# Patient Record
Sex: FEMALE | Race: WHITE | NOT HISPANIC OR LATINO | Employment: OTHER | URBAN - METROPOLITAN AREA
[De-identification: names, ages, dates, MRNs, and addresses within clinical notes are randomized per-mention and may not be internally consistent; named-entity substitution may affect disease eponyms.]

---

## 2017-03-14 ENCOUNTER — HISTORICAL (OUTPATIENT)
Dept: ADMINISTRATIVE | Facility: HOSPITAL | Age: 82
End: 2017-03-14

## 2017-03-29 ENCOUNTER — HISTORICAL (OUTPATIENT)
Dept: ADMINISTRATIVE | Facility: HOSPITAL | Age: 82
End: 2017-03-29

## 2017-03-30 ENCOUNTER — HISTORICAL (OUTPATIENT)
Dept: RADIOLOGY | Facility: HOSPITAL | Age: 82
End: 2017-03-30

## 2017-03-31 ENCOUNTER — HISTORICAL (OUTPATIENT)
Dept: ADMINISTRATIVE | Facility: HOSPITAL | Age: 82
End: 2017-03-31

## 2017-05-08 ENCOUNTER — HISTORICAL (OUTPATIENT)
Dept: ADMINISTRATIVE | Facility: HOSPITAL | Age: 82
End: 2017-05-08

## 2017-08-09 ENCOUNTER — HISTORICAL (OUTPATIENT)
Dept: ADMINISTRATIVE | Facility: HOSPITAL | Age: 82
End: 2017-08-09

## 2018-03-28 ENCOUNTER — HISTORICAL (OUTPATIENT)
Dept: ADMINISTRATIVE | Facility: HOSPITAL | Age: 83
End: 2018-03-28

## 2018-03-28 LAB
ABS NEUT (OLG): 7.01 X10(3)/MCL (ref 2.1–9.2)
ALBUMIN SERPL-MCNC: 4.2 GM/DL (ref 3.4–5)
ALBUMIN/GLOB SERPL: 1.4 {RATIO}
ALP SERPL-CCNC: 91 UNIT/L (ref 38–126)
ALT SERPL-CCNC: 21 UNIT/L (ref 12–78)
APPEARANCE, UA: CLEAR
AST SERPL-CCNC: 15 UNIT/L (ref 15–37)
BACTERIA SPEC CULT: ABNORMAL /HPF
BASOPHILS # BLD AUTO: 0 X10(3)/MCL (ref 0–0.2)
BASOPHILS NFR BLD AUTO: 0 %
BILIRUB SERPL-MCNC: 0.5 MG/DL (ref 0.2–1)
BILIRUB UR QL STRIP: NEGATIVE
BILIRUBIN DIRECT+TOT PNL SERPL-MCNC: 0.1 MG/DL (ref 0–0.2)
BILIRUBIN DIRECT+TOT PNL SERPL-MCNC: 0.4 MG/DL (ref 0–0.8)
BUN SERPL-MCNC: 16 MG/DL (ref 7–18)
CALCIUM SERPL-MCNC: 9.3 MG/DL (ref 8.5–10.1)
CHLORIDE SERPL-SCNC: 104 MMOL/L (ref 98–107)
CHOLEST SERPL-MCNC: 239 MG/DL (ref 0–200)
CHOLEST/HDLC SERPL: 2 {RATIO} (ref 0–4)
CO2 SERPL-SCNC: 30 MMOL/L (ref 21–32)
COLOR UR: YELLOW
CREAT SERPL-MCNC: 0.98 MG/DL (ref 0.55–1.02)
DEPRECATED CALCIDIOL+CALCIFEROL SERPL-MC: 31.98 NG/ML (ref 30–80)
EOSINOPHIL # BLD AUTO: 0.1 X10(3)/MCL (ref 0–0.9)
EOSINOPHIL NFR BLD AUTO: 1 %
ERYTHROCYTE [DISTWIDTH] IN BLOOD BY AUTOMATED COUNT: 12.9 % (ref 11.5–17)
GLOBULIN SER-MCNC: 3.1 GM/DL (ref 2.4–3.5)
GLUCOSE (UA): NEGATIVE
GLUCOSE SERPL-MCNC: 91 MG/DL (ref 74–106)
HCT VFR BLD AUTO: 44.9 % (ref 37–47)
HDLC SERPL-MCNC: 117 MG/DL (ref 35–60)
HGB BLD-MCNC: 14.8 GM/DL (ref 12–16)
HGB UR QL STRIP: NEGATIVE
KETONES UR QL STRIP: NEGATIVE
LDLC SERPL CALC-MCNC: 109 MG/DL (ref 0–129)
LEUKOCYTE ESTERASE UR QL STRIP: NEGATIVE
LYMPHOCYTES # BLD AUTO: 1.6 X10(3)/MCL (ref 0.6–4.6)
LYMPHOCYTES NFR BLD AUTO: 17 %
MCH RBC QN AUTO: 29.9 PG (ref 27–31)
MCHC RBC AUTO-ENTMCNC: 33 GM/DL (ref 33–36)
MCV RBC AUTO: 90.7 FL (ref 80–94)
MONOCYTES # BLD AUTO: 0.5 X10(3)/MCL (ref 0.1–1.3)
MONOCYTES NFR BLD AUTO: 5 %
NEUTROPHILS # BLD AUTO: 7.01 X10(3)/MCL (ref 2.1–9.2)
NEUTROPHILS NFR BLD AUTO: 76 %
NITRITE UR QL STRIP: NEGATIVE
PH UR STRIP: 7.5 [PH] (ref 5–9)
PLATELET # BLD AUTO: 201 X10(3)/MCL (ref 130–400)
PMV BLD AUTO: 10.3 FL (ref 9.4–12.4)
POTASSIUM SERPL-SCNC: 4.7 MMOL/L (ref 3.5–5.1)
PROT SERPL-MCNC: 7.3 GM/DL (ref 6.4–8.2)
PROT UR QL STRIP: NEGATIVE
RBC # BLD AUTO: 4.95 X10(6)/MCL (ref 4.2–5.4)
RBC #/AREA URNS HPF: ABNORMAL /[HPF]
SODIUM SERPL-SCNC: 139 MMOL/L (ref 136–145)
SP GR UR STRIP: 1.01 (ref 1–1.03)
SQUAMOUS EPITHELIAL, UA: ABNORMAL
TRIGL SERPL-MCNC: 67 MG/DL (ref 30–150)
TSH SERPL-ACNC: 1.72 MIU/ML (ref 0.36–3.74)
UA WBC MAN: ABNORMAL
UROBILINOGEN UR STRIP-ACNC: 0.2
VLDLC SERPL CALC-MCNC: 13 MG/DL
WBC # SPEC AUTO: 9.3 X10(3)/MCL (ref 4.5–11.5)

## 2019-03-17 LAB
INFLUENZA A ANTIGEN, POC: NEGATIVE
INFLUENZA B ANTIGEN, POC: NEGATIVE

## 2019-05-02 ENCOUNTER — HISTORICAL (OUTPATIENT)
Dept: ADMINISTRATIVE | Facility: HOSPITAL | Age: 84
End: 2019-05-02

## 2019-05-02 LAB
ABS NEUT (OLG): 3.19 X10(3)/MCL (ref 2.1–9.2)
ALBUMIN SERPL-MCNC: 4.1 GM/DL (ref 3.4–5)
ALBUMIN/GLOB SERPL: 1.4 {RATIO}
ALP SERPL-CCNC: 66 UNIT/L (ref 38–126)
ALT SERPL-CCNC: 22 UNIT/L (ref 12–78)
APPEARANCE, UA: CLEAR
AST SERPL-CCNC: 17 UNIT/L (ref 15–37)
BACTERIA SPEC CULT: NORMAL /HPF
BASOPHILS # BLD AUTO: 0 X10(3)/MCL (ref 0–0.2)
BASOPHILS NFR BLD AUTO: 1 %
BILIRUB SERPL-MCNC: 1.1 MG/DL (ref 0.2–1)
BILIRUB UR QL STRIP: NEGATIVE
BILIRUBIN DIRECT+TOT PNL SERPL-MCNC: 0.2 MG/DL (ref 0–0.2)
BILIRUBIN DIRECT+TOT PNL SERPL-MCNC: 0.9 MG/DL (ref 0–0.8)
BUN SERPL-MCNC: 22 MG/DL (ref 7–18)
CALCIUM SERPL-MCNC: 9.4 MG/DL (ref 8.5–10.1)
CHLORIDE SERPL-SCNC: 99 MMOL/L (ref 98–107)
CHOLEST SERPL-MCNC: 222 MG/DL (ref 0–200)
CHOLEST/HDLC SERPL: 2.1 {RATIO} (ref 0–4)
CO2 SERPL-SCNC: 29 MMOL/L (ref 21–32)
COLOR UR: YELLOW
CREAT SERPL-MCNC: 1.16 MG/DL (ref 0.55–1.02)
DEPRECATED CALCIDIOL+CALCIFEROL SERPL-MC: 41.71 NG/ML (ref 30–80)
EOSINOPHIL # BLD AUTO: 0.1 X10(3)/MCL (ref 0–0.9)
EOSINOPHIL NFR BLD AUTO: 2 %
ERYTHROCYTE [DISTWIDTH] IN BLOOD BY AUTOMATED COUNT: 13.3 % (ref 11.5–17)
GLOBULIN SER-MCNC: 2.9 GM/DL (ref 2.4–3.5)
GLUCOSE (UA): NEGATIVE
GLUCOSE SERPL-MCNC: 106 MG/DL (ref 74–106)
HCT VFR BLD AUTO: 44.4 % (ref 37–47)
HDLC SERPL-MCNC: 107 MG/DL (ref 35–60)
HGB BLD-MCNC: 14.7 GM/DL (ref 12–16)
HGB UR QL STRIP: NEGATIVE
KETONES UR QL STRIP: NEGATIVE
LDLC SERPL CALC-MCNC: 102 MG/DL (ref 0–129)
LEUKOCYTE ESTERASE UR QL STRIP: NEGATIVE
LYMPHOCYTES # BLD AUTO: 1.4 X10(3)/MCL (ref 0.6–4.6)
LYMPHOCYTES NFR BLD AUTO: 27 %
MCH RBC QN AUTO: 30.1 PG (ref 27–31)
MCHC RBC AUTO-ENTMCNC: 33.1 GM/DL (ref 33–36)
MCV RBC AUTO: 90.8 FL (ref 80–94)
MONOCYTES # BLD AUTO: 0.4 X10(3)/MCL (ref 0.1–1.3)
MONOCYTES NFR BLD AUTO: 8 %
NEUTROPHILS # BLD AUTO: 3.19 X10(3)/MCL (ref 2.1–9.2)
NEUTROPHILS NFR BLD AUTO: 62 %
NITRITE UR QL STRIP: NEGATIVE
PH UR STRIP: 6.5 [PH] (ref 5–9)
PLATELET # BLD AUTO: 176 X10(3)/MCL (ref 130–400)
PMV BLD AUTO: 10.9 FL (ref 9.4–12.4)
POTASSIUM SERPL-SCNC: 5 MMOL/L (ref 3.5–5.1)
PROT SERPL-MCNC: 7 GM/DL (ref 6.4–8.2)
PROT UR QL STRIP: NEGATIVE
RBC # BLD AUTO: 4.89 X10(6)/MCL (ref 4.2–5.4)
RBC #/AREA URNS HPF: NORMAL /[HPF]
SODIUM SERPL-SCNC: 133 MMOL/L (ref 136–145)
SP GR UR STRIP: 1.02 (ref 1–1.03)
SQUAMOUS EPITHELIAL, UA: NORMAL
TRIGL SERPL-MCNC: 63 MG/DL (ref 30–150)
TSH SERPL-ACNC: 1.91 MIU/L (ref 0.36–3.74)
UROBILINOGEN UR STRIP-ACNC: 0.2
VLDLC SERPL CALC-MCNC: 13 MG/DL
WBC # SPEC AUTO: 5.1 X10(3)/MCL (ref 4.5–11.5)
WBC #/AREA URNS HPF: NORMAL /[HPF]

## 2020-06-25 ENCOUNTER — HISTORICAL (OUTPATIENT)
Dept: ADMINISTRATIVE | Facility: HOSPITAL | Age: 85
End: 2020-06-25

## 2020-07-17 ENCOUNTER — HISTORICAL (OUTPATIENT)
Dept: ADMINISTRATIVE | Facility: HOSPITAL | Age: 85
End: 2020-07-17

## 2020-07-17 LAB
ABS NEUT (OLG): 3 X10(3)/MCL (ref 2.1–9.2)
ALBUMIN SERPL-MCNC: 4.1 GM/DL (ref 3.4–5)
ALBUMIN/GLOB SERPL: 1.6 RATIO (ref 1.1–2)
ALP SERPL-CCNC: 69 UNIT/L (ref 40–150)
ALT SERPL-CCNC: 12 UNIT/L (ref 0–55)
APPEARANCE, UA: CLEAR
AST SERPL-CCNC: 15 UNIT/L (ref 5–34)
BACTERIA SPEC CULT: NORMAL /HPF
BASOPHILS # BLD AUTO: 0 X10(3)/MCL (ref 0–0.2)
BASOPHILS NFR BLD AUTO: 0 %
BILIRUB SERPL-MCNC: 1.1 MG/DL
BILIRUB UR QL STRIP: NEGATIVE
BILIRUBIN DIRECT+TOT PNL SERPL-MCNC: 0.4 MG/DL (ref 0–0.5)
BILIRUBIN DIRECT+TOT PNL SERPL-MCNC: 0.7 MG/DL (ref 0–0.8)
BUN SERPL-MCNC: 18.4 MG/DL (ref 9.8–20.1)
CALCIUM SERPL-MCNC: 9.1 MG/DL (ref 8.4–10.2)
CHLORIDE SERPL-SCNC: 103 MMOL/L (ref 98–107)
CHOLEST SERPL-MCNC: 228 MG/DL
CHOLEST/HDLC SERPL: 3 {RATIO} (ref 0–5)
CO2 SERPL-SCNC: 26 MMOL/L (ref 23–31)
COLOR UR: YELLOW
CREAT SERPL-MCNC: 1.02 MG/DL (ref 0.55–1.02)
DEPRECATED CALCIDIOL+CALCIFEROL SERPL-MC: 33 NG/ML (ref 6.6–49.9)
EOSINOPHIL # BLD AUTO: 0.2 X10(3)/MCL (ref 0–0.9)
EOSINOPHIL NFR BLD AUTO: 4 %
ERYTHROCYTE [DISTWIDTH] IN BLOOD BY AUTOMATED COUNT: 13.2 % (ref 11.5–17)
GLOBULIN SER-MCNC: 2.5 GM/DL (ref 2.4–3.5)
GLUCOSE (UA): NEGATIVE
GLUCOSE SERPL-MCNC: 99 MG/DL (ref 82–115)
HCT VFR BLD AUTO: 45.8 % (ref 37–47)
HDLC SERPL-MCNC: 84 MG/DL (ref 35–60)
HGB BLD-MCNC: 15.1 GM/DL (ref 12–16)
HGB UR QL STRIP: NEGATIVE
KETONES UR QL STRIP: NEGATIVE
LDLC SERPL CALC-MCNC: 124 MG/DL (ref 50–140)
LEUKOCYTE ESTERASE UR QL STRIP: NEGATIVE
LYMPHOCYTES # BLD AUTO: 1.5 X10(3)/MCL (ref 0.6–4.6)
LYMPHOCYTES NFR BLD AUTO: 29 %
MCH RBC QN AUTO: 31.3 PG (ref 27–31)
MCHC RBC AUTO-ENTMCNC: 33 GM/DL (ref 33–36)
MCV RBC AUTO: 94.8 FL (ref 80–94)
MONOCYTES # BLD AUTO: 0.4 X10(3)/MCL (ref 0.1–1.3)
MONOCYTES NFR BLD AUTO: 8 %
NEUTROPHILS # BLD AUTO: 3 X10(3)/MCL (ref 2.1–9.2)
NEUTROPHILS NFR BLD AUTO: 58 %
NITRITE UR QL STRIP: NEGATIVE
PH UR STRIP: 5.5 [PH] (ref 5–9)
PLATELET # BLD AUTO: 178 X10(3)/MCL (ref 130–400)
PMV BLD AUTO: 10.8 FL (ref 9.4–12.4)
POTASSIUM SERPL-SCNC: 4.6 MMOL/L (ref 3.5–5.1)
PROT SERPL-MCNC: 6.6 GM/DL (ref 5.8–7.6)
PROT UR QL STRIP: NEGATIVE
RBC # BLD AUTO: 4.83 X10(6)/MCL (ref 4.2–5.4)
RBC #/AREA URNS HPF: NORMAL /[HPF]
SODIUM SERPL-SCNC: 137 MMOL/L (ref 136–145)
SP GR UR STRIP: 1.01 (ref 1–1.03)
SQUAMOUS EPITHELIAL, UA: NORMAL
TRIGL SERPL-MCNC: 101 MG/DL (ref 37–140)
TSH SERPL-ACNC: 2.36 UIU/ML (ref 0.35–4.94)
UROBILINOGEN UR STRIP-ACNC: 0.2
VLDLC SERPL CALC-MCNC: 20 MG/DL
WBC # SPEC AUTO: 5.2 X10(3)/MCL (ref 4.5–11.5)
WBC #/AREA URNS HPF: NORMAL /[HPF]

## 2021-08-09 ENCOUNTER — HISTORICAL (OUTPATIENT)
Dept: ADMINISTRATIVE | Facility: HOSPITAL | Age: 86
End: 2021-08-09

## 2021-08-09 LAB
ABS NEUT (OLG): 3.52 X10(3)/MCL (ref 2.1–9.2)
ALBUMIN SERPL-MCNC: 4 GM/DL (ref 3.4–4.8)
ALBUMIN/GLOB SERPL: 1.4 RATIO (ref 1.1–2)
ALP SERPL-CCNC: 69 UNIT/L (ref 40–150)
ALT SERPL-CCNC: 13 UNIT/L (ref 0–55)
AST SERPL-CCNC: 19 UNIT/L (ref 5–34)
BASOPHILS # BLD AUTO: 0 X10(3)/MCL (ref 0–0.2)
BASOPHILS NFR BLD AUTO: 1 %
BILIRUB SERPL-MCNC: 0.7 MG/DL
BILIRUBIN DIRECT+TOT PNL SERPL-MCNC: 0.3 MG/DL (ref 0–0.5)
BILIRUBIN DIRECT+TOT PNL SERPL-MCNC: 0.4 MG/DL (ref 0–0.8)
BUN SERPL-MCNC: 19.7 MG/DL (ref 9.8–20.1)
CALCIUM SERPL-MCNC: 9.5 MG/DL (ref 8.4–10.2)
CHLORIDE SERPL-SCNC: 101 MMOL/L (ref 98–107)
CHOLEST SERPL-MCNC: 214 MG/DL
CHOLEST/HDLC SERPL: 3 {RATIO} (ref 0–5)
CO2 SERPL-SCNC: 25 MMOL/L (ref 23–31)
CREAT SERPL-MCNC: 0.96 MG/DL (ref 0.55–1.02)
DEPRECATED CALCIDIOL+CALCIFEROL SERPL-MC: 67.8 NG/ML (ref 30–80)
EOSINOPHIL # BLD AUTO: 0.2 X10(3)/MCL (ref 0–0.9)
EOSINOPHIL NFR BLD AUTO: 4 %
ERYTHROCYTE [DISTWIDTH] IN BLOOD BY AUTOMATED COUNT: 12.9 % (ref 11.5–17)
GLOBULIN SER-MCNC: 2.9 GM/DL (ref 2.4–3.5)
GLUCOSE SERPL-MCNC: 95 MG/DL (ref 82–115)
HCT VFR BLD AUTO: 42.2 % (ref 37–47)
HDLC SERPL-MCNC: 70 MG/DL (ref 35–60)
HGB BLD-MCNC: 14.3 GM/DL (ref 12–16)
LDLC SERPL CALC-MCNC: 118 MG/DL (ref 50–140)
LYMPHOCYTES # BLD AUTO: 1.3 X10(3)/MCL (ref 0.6–4.6)
LYMPHOCYTES NFR BLD AUTO: 23 %
MCH RBC QN AUTO: 30.1 PG (ref 27–31)
MCHC RBC AUTO-ENTMCNC: 33.9 GM/DL (ref 33–36)
MCV RBC AUTO: 88.8 FL (ref 80–94)
MONOCYTES # BLD AUTO: 0.4 X10(3)/MCL (ref 0.1–1.3)
MONOCYTES NFR BLD AUTO: 8 %
NEUTROPHILS # BLD AUTO: 3.52 X10(3)/MCL (ref 2.1–9.2)
NEUTROPHILS NFR BLD AUTO: 63 %
PLATELET # BLD AUTO: 209 X10(3)/MCL (ref 130–400)
PMV BLD AUTO: 10.9 FL (ref 9.4–12.4)
POTASSIUM SERPL-SCNC: 4.6 MMOL/L (ref 3.5–5.1)
PROT SERPL-MCNC: 6.9 GM/DL (ref 5.8–7.6)
RBC # BLD AUTO: 4.75 X10(6)/MCL (ref 4.2–5.4)
SODIUM SERPL-SCNC: 138 MMOL/L (ref 136–145)
TRIGL SERPL-MCNC: 128 MG/DL (ref 37–140)
VLDLC SERPL CALC-MCNC: 26 MG/DL
WBC # SPEC AUTO: 5.6 X10(3)/MCL (ref 4.5–11.5)

## 2022-02-01 ENCOUNTER — HISTORICAL (OUTPATIENT)
Dept: CARDIOLOGY | Facility: HOSPITAL | Age: 87
End: 2022-02-01

## 2022-03-31 ENCOUNTER — HISTORICAL (OUTPATIENT)
Dept: ADMINISTRATIVE | Facility: HOSPITAL | Age: 87
End: 2022-03-31

## 2022-04-11 ENCOUNTER — HISTORICAL (OUTPATIENT)
Dept: ADMINISTRATIVE | Facility: HOSPITAL | Age: 87
End: 2022-04-11
Payer: MEDICARE

## 2022-04-28 ENCOUNTER — HISTORICAL (OUTPATIENT)
Dept: ADMINISTRATIVE | Facility: HOSPITAL | Age: 87
End: 2022-04-28
Payer: MEDICARE

## 2022-04-29 VITALS
SYSTOLIC BLOOD PRESSURE: 152 MMHG | OXYGEN SATURATION: 97 % | HEIGHT: 64 IN | WEIGHT: 145.5 LBS | BODY MASS INDEX: 24.84 KG/M2 | DIASTOLIC BLOOD PRESSURE: 83 MMHG

## 2022-05-14 NOTE — OP NOTE
Patient:   Kiarra Arredondo            MRN: [id number]            FIN: [id number]               Age:   86 years     Sex:  Female     :  1935   Associated Diagnoses:   None   Author:   Micah Noble MD      Phacoemulsification of Cataract with Toric Intraocular Implant    Preoperative Diagnosis: Cataract Right Eye     Postoperative Diagnosis: Cataract Right Eye     Surgeon: Micah Noble MD    Assistant: BEBETO Min    Anestheisa: MAC    Complications: None    After the patient underwent topical anesthesia along with IV sedation in the holding area, the patient was brought to the [facility] laser.  The patient was marked at 0 & 180 degrees.  A time out was performed.  The capsulotomy & lens fragmentation were completed.  Then the patient was brought to the operating suite.  The patient was then prepped and draped in a sterile fashion.  A pediatric tegaderm and a lid speculum were used to retract the upper and lower lashes and lids.  A 1.0mm paracentesis was then made at the (_12_) o'clock position.  Intraocular non-preserved 1% Xylocaine (4% diluted down to 1%) was irrigated into the anterior chamber.  Endocoat was injected into the eye.  A clear corneal incision was made with a 2.4mm Keratome blade.  BSS was used to hydro dissect the nucleus from the capsule.  The nucleus was then phacoemulsified with the Abbott machine for a EFX of (_37_).  The cortex was then removed with a I/A hand piece and Helon was placed into the posterior bag of the eye.  A posterior chamber implant (_[id number]__) of power (_16.5__) was placed in the capsular bag @ (__178_) degrees.  The Helon was then removed from the eye with the I/A hand piece.  The anterior chamber was inflated with BSS and the wound was checked fro leaks.  The lid speculum was removed and a drop of Ofloxacin 0.3% was placed in the operative eye.  The patient was brought to the surgery suite in stable condition.

## 2022-05-14 NOTE — OP NOTE
Patient:   Kiarra Arredondo            MRN: [id number]            FIN: [id number]               Age:   86 years     Sex:  Female     :  1935   Associated Diagnoses:   None   Author:   Micah Noble MD      Phacoemulsification of Cataract with Toric Intraocular Implant    Preoperative Diagnosis: Cataract Left Eye    Postoperative Diagnosis: Cataract Left Eye    Surgeon: Micah Noble MD    Assistant: BEBETO Min    Anestheisa: MAC    Complications: None    After the patient underwent topical anesthesia along with IV sedation in the holding area, the patient was brought to the hospitals laser.  The patient was marked at 0 & 180 degrees.  A time out was performed.  The capsulotomy &  lens fragmentation were completed.  Then the patient was brought to the operating suite.  The patient was then prepped and draped in a sterile fashion.  A pediatric tegaderm and a lid speculum were used to retract the upper and lower lashes and lids.  A 1.0mm paracentesis was then made at the (_5 & 11_) o'clock position.  Intraocular non-preserved 1% Xylocaine (4% diluted down to 1%) was irrigated into the anterior chamber.  Endocoat was injected into the eye.  A clear corneal incision was made with a 2.4mm Keratome blade.  BSS was used to hydro dissect the nucleus from the capsule.  The nucleus was then phacoemulsified with the Abbott machine for a EFX of (19).  The cortex was then removed with a I/A hand piece and Helon was placed into the posterior bag of the eye.  A posterior chamber implant (_[id number]__) of power (_17.5__) was placed in the capsular bag @ (_002__) degrees.  The Helon was then removed from the eye with the I/A hand piece.  The anterior chamber was inflated with BSS and the wound was checked fro leaks.  The lid speculum was removed and a drop of Ofloxacin 0.3% was placed in the operative eye.  The patient was brought to the surgery suite in stable condition.

## 2022-07-25 DIAGNOSIS — I10 HYPERTENSION, UNSPECIFIED TYPE: Primary | ICD-10-CM

## 2022-07-25 DIAGNOSIS — Z13.6 SCREENING FOR ISCHEMIC HEART DISEASE: ICD-10-CM

## 2022-07-25 DIAGNOSIS — Z13.220 SCREENING FOR HYPERLIPIDEMIA: ICD-10-CM

## 2022-07-26 ENCOUNTER — TELEPHONE (OUTPATIENT)
Dept: INTERNAL MEDICINE | Facility: CLINIC | Age: 87
End: 2022-07-26
Payer: MEDICARE

## 2022-07-26 NOTE — TELEPHONE ENCOUNTER
----- Message from Beatriz Lejeune sent at 7/26/2022 11:13 AM CDT -----  Regarding: RE: Daniela Rodriguez 8/2/22 @1000  Pt informed of OV, labs, and check in protocol.  She verbalized understanding.   ----- Message -----  From: Mindy Adan LPN  Sent: 7/26/2022   9:27 AM CDT  To: Cheryl Lejeune  Subject: Daniela Rodriguez 8/2/22 @1000                         Are there any outstanding tasks in the chart? Pt will need fasting labs.    Is there any documentation of tasks? No    Has patient seen another physician, been to the ER, C, or admitted to hospital since last visit?    Has the patient done blood work or imaging since last visit?

## 2022-07-28 ENCOUNTER — LAB VISIT (OUTPATIENT)
Dept: LAB | Facility: HOSPITAL | Age: 87
End: 2022-07-28
Attending: INTERNAL MEDICINE
Payer: MEDICARE

## 2022-07-28 DIAGNOSIS — I10 HYPERTENSION, UNSPECIFIED TYPE: ICD-10-CM

## 2022-07-28 DIAGNOSIS — Z13.220 SCREENING FOR HYPERLIPIDEMIA: ICD-10-CM

## 2022-07-28 DIAGNOSIS — Z13.6 SCREENING FOR ISCHEMIC HEART DISEASE: ICD-10-CM

## 2022-07-28 LAB
ALBUMIN SERPL-MCNC: 4 GM/DL (ref 3.4–4.8)
ALBUMIN/GLOB SERPL: 1.4 RATIO (ref 1.1–2)
ALP SERPL-CCNC: 69 UNIT/L (ref 40–150)
ALT SERPL-CCNC: 15 UNIT/L (ref 0–55)
AST SERPL-CCNC: 20 UNIT/L (ref 5–34)
BASOPHILS # BLD AUTO: 0.03 X10(3)/MCL (ref 0–0.2)
BASOPHILS NFR BLD AUTO: 0.6 %
BILIRUBIN DIRECT+TOT PNL SERPL-MCNC: 0.9 MG/DL
BUN SERPL-MCNC: 24.8 MG/DL (ref 9.8–20.1)
CALCIUM SERPL-MCNC: 9.7 MG/DL (ref 8.4–10.2)
CHLORIDE SERPL-SCNC: 101 MMOL/L (ref 98–107)
CHOLEST SERPL-MCNC: 231 MG/DL
CHOLEST/HDLC SERPL: 3 {RATIO} (ref 0–5)
CO2 SERPL-SCNC: 26 MMOL/L (ref 23–31)
CREAT SERPL-MCNC: 1.09 MG/DL (ref 0.55–1.02)
EOSINOPHIL # BLD AUTO: 0.15 X10(3)/MCL (ref 0–0.9)
EOSINOPHIL NFR BLD AUTO: 3.1 %
ERYTHROCYTE [DISTWIDTH] IN BLOOD BY AUTOMATED COUNT: 13.5 % (ref 11.5–17)
GLOBULIN SER-MCNC: 2.8 GM/DL (ref 2.4–3.5)
GLUCOSE SERPL-MCNC: 105 MG/DL (ref 82–115)
HCT VFR BLD AUTO: 43.2 % (ref 37–47)
HDLC SERPL-MCNC: 75 MG/DL (ref 35–60)
HGB BLD-MCNC: 14.1 GM/DL (ref 12–16)
IMM GRANULOCYTES # BLD AUTO: 0.01 X10(3)/MCL (ref 0–0.04)
IMM GRANULOCYTES NFR BLD AUTO: 0.2 %
LDLC SERPL CALC-MCNC: 138 MG/DL (ref 50–140)
LYMPHOCYTES # BLD AUTO: 1.41 X10(3)/MCL (ref 0.6–4.6)
LYMPHOCYTES NFR BLD AUTO: 29.4 %
MCH RBC QN AUTO: 29.8 PG (ref 27–31)
MCHC RBC AUTO-ENTMCNC: 32.6 MG/DL (ref 33–36)
MCV RBC AUTO: 91.3 FL (ref 80–94)
MONOCYTES # BLD AUTO: 0.43 X10(3)/MCL (ref 0.1–1.3)
MONOCYTES NFR BLD AUTO: 9 %
NEUTROPHILS # BLD AUTO: 2.8 X10(3)/MCL (ref 2.1–9.2)
NEUTROPHILS NFR BLD AUTO: 57.7 %
NRBC BLD AUTO-RTO: 0 %
PLATELET # BLD AUTO: 183 X10(3)/MCL (ref 130–400)
PMV BLD AUTO: 10.7 FL (ref 7.4–10.4)
POTASSIUM SERPL-SCNC: 4.6 MMOL/L (ref 3.5–5.1)
PROT SERPL-MCNC: 6.8 GM/DL (ref 5.8–7.6)
RBC # BLD AUTO: 4.73 X10(6)/MCL (ref 4.2–5.4)
SODIUM SERPL-SCNC: 135 MMOL/L (ref 136–145)
TRIGL SERPL-MCNC: 91 MG/DL (ref 37–140)
VLDLC SERPL CALC-MCNC: 18 MG/DL
WBC # SPEC AUTO: 4.8 X10(3)/MCL (ref 4.5–11.5)

## 2022-07-28 PROCEDURE — 36415 COLL VENOUS BLD VENIPUNCTURE: CPT

## 2022-07-28 PROCEDURE — 80053 COMPREHEN METABOLIC PANEL: CPT

## 2022-07-28 PROCEDURE — 84075 ASSAY ALKALINE PHOSPHATASE: CPT

## 2022-07-28 PROCEDURE — 83718 ASSAY OF LIPOPROTEIN: CPT

## 2022-07-28 PROCEDURE — 85025 COMPLETE CBC W/AUTO DIFF WBC: CPT

## 2022-08-02 ENCOUNTER — OFFICE VISIT (OUTPATIENT)
Dept: INTERNAL MEDICINE | Facility: CLINIC | Age: 87
End: 2022-08-02
Payer: MEDICARE

## 2022-08-02 VITALS
RESPIRATION RATE: 18 BRPM | HEIGHT: 64 IN | DIASTOLIC BLOOD PRESSURE: 60 MMHG | BODY MASS INDEX: 25.61 KG/M2 | SYSTOLIC BLOOD PRESSURE: 132 MMHG | WEIGHT: 150 LBS | HEART RATE: 67 BPM | OXYGEN SATURATION: 94 %

## 2022-08-02 DIAGNOSIS — E78.5 HYPERLIPIDEMIA, UNSPECIFIED HYPERLIPIDEMIA TYPE: ICD-10-CM

## 2022-08-02 DIAGNOSIS — I10 PRIMARY HYPERTENSION: Primary | ICD-10-CM

## 2022-08-02 PROBLEM — K21.9 GASTROESOPHAGEAL REFLUX DISEASE: Status: ACTIVE | Noted: 2022-08-02

## 2022-08-02 PROBLEM — H25.9 AGE-RELATED CATARACT: Status: RESOLVED | Noted: 2022-08-02 | Resolved: 2022-08-02

## 2022-08-02 PROBLEM — H25.9 AGE-RELATED CATARACT: Status: ACTIVE | Noted: 2022-08-02

## 2022-08-02 PROBLEM — I34.1 MITRAL VALVE PROLAPSE: Status: ACTIVE | Noted: 2022-08-02

## 2022-08-02 PROCEDURE — 99213 PR OFFICE/OUTPT VISIT, EST, LEVL III, 20-29 MIN: ICD-10-PCS | Mod: ,,, | Performed by: INTERNAL MEDICINE

## 2022-08-02 PROCEDURE — 99213 OFFICE O/P EST LOW 20 MIN: CPT | Mod: ,,, | Performed by: INTERNAL MEDICINE

## 2022-08-02 RX ORDER — HYDROCHLOROTHIAZIDE 12.5 MG/1
12.5 TABLET ORAL DAILY
COMMUNITY
End: 2023-03-22 | Stop reason: SDUPTHER

## 2022-08-02 NOTE — PROGRESS NOTES
"Subjective:      Chief Complaint: Follow-up (6mo-discuss labs 7/28)      HPI:  She is here for f/u htn.  She has no complaints.  She doesn't check her BP at home.    Problem Noted   Gastroesophageal Reflux Disease 8/2/2022   Hypertension 8/2/2022   Mitral Valve Prolapse 8/2/2022   Hyperlipidemia 8/2/2022   Age-Related Cataract (Resolved) 8/2/2022        The patient's Health Maintenance was reviewed and the following appears to be due:   Health Maintenance Due   Topic Date Due    TETANUS VACCINE  Never done    Shingles Vaccine (1 of 2) Never done    COVID-19 Vaccine (3 - Booster for Pfizer series) 06/28/2021       Past Medical History:  Past Medical History:   Diagnosis Date    Age-related cataract 8/2/2022     Past Surgical History:   Procedure Laterality Date    CATARACT EXTRACTION      COLONOSCOPY      SINUS SURGERY      TOTAL ABDOMINAL HYSTERECTOMY W/ BILATERAL SALPINGOOPHORECTOMY       Review of patient's allergies indicates:   Allergen Reactions    Codeine      Other reaction(s): Dizzy     Current Outpatient Medications on File Prior to Visit   Medication Sig Dispense Refill    hydroCHLOROthiazide (HYDRODIURIL) 12.5 MG Tab Take 12.5 mg by mouth once daily.       No current facility-administered medications on file prior to visit.     Social History     Socioeconomic History    Marital status:    Tobacco Use    Smoking status: Never Smoker    Smokeless tobacco: Never Used     Family History   Problem Relation Age of Onset    Atrial fibrillation Mother     Heart failure Father        Review of Systems    Objective:   /60 (BP Location: Right arm)   Pulse 67   Resp 18   Ht 5' 3.78" (1.62 m)   Wt 68 kg (150 lb)   SpO2 (!) 94%   BMI 25.93 kg/m²     Physical Exam  Constitutional:       General: She is not in acute distress.     Appearance: Normal appearance.   HENT:      Head: Normocephalic and atraumatic.   Eyes:      General: No scleral icterus.  Neck:      Vascular: No carotid " bruit.   Cardiovascular:      Rate and Rhythm: Normal rate and regular rhythm.      Pulses: Normal pulses.      Heart sounds: Normal heart sounds. No murmur heard.    No friction rub. No gallop.   Pulmonary:      Effort: Pulmonary effort is normal.      Breath sounds: Normal breath sounds.   Abdominal:      Palpations: There is no mass.      Tenderness: There is no abdominal tenderness. There is no guarding or rebound.   Musculoskeletal:         General: No deformity.      Cervical back: No rigidity or tenderness.      Right lower leg: No edema.      Left lower leg: No edema.   Lymphadenopathy:      Cervical: No cervical adenopathy.   Skin:     General: Skin is warm and dry.      Coloration: Skin is not jaundiced or pale.      Findings: No erythema.   Neurological:      General: No focal deficit present.      Mental Status: She is alert and oriented to person, place, and time.      Gait: Gait normal.   Psychiatric:         Mood and Affect: Mood normal.         Behavior: Behavior normal.         Thought Content: Thought content normal.         Judgment: Judgment normal.       Assessment and Plan:     Hypertension  Her BP is borderline.  I rec she monitor more routinely at home.  On repeat today it was 132/60.  Cont hctz for now.  We discussed recommendations for fluid intake -- 64 oz daily.    Hyperlipidemia  No statin recommended because of her age.  Information on low cholesterol diet given to patient.     Follow up in about 6 months (around 2/2/2023).       [unfilled]  No orders of the defined types were placed in this encounter.

## 2022-08-02 NOTE — ASSESSMENT & PLAN NOTE
Her BP is borderline.  I rec she monitor more routinely at home.  On repeat today it was 132/60.  Cont hctz for now.  We discussed recommendations for fluid intake -- 64 oz daily.

## 2022-09-22 ENCOUNTER — HISTORICAL (OUTPATIENT)
Dept: ADMINISTRATIVE | Facility: HOSPITAL | Age: 87
End: 2022-09-22
Payer: MEDICARE

## 2022-10-28 ENCOUNTER — TELEPHONE (OUTPATIENT)
Dept: INTERNAL MEDICINE | Facility: CLINIC | Age: 87
End: 2022-10-28
Payer: MEDICARE

## 2023-01-26 ENCOUNTER — TELEPHONE (OUTPATIENT)
Dept: INTERNAL MEDICINE | Facility: CLINIC | Age: 88
End: 2023-01-26
Payer: MEDICARE

## 2023-01-26 NOTE — TELEPHONE ENCOUNTER
----- Message from Mindy Adan LPN sent at 1/26/2023  1:07 PM CST -----  Regarding: BALDEMAR Rodriguez 2/2/23 @10:00a  Are there any outstanding tasks in the chart? Pt will need nonfasting labs    Is there any documentation of tasks? no    Has patient seen another physician, been to the ER, [facility], or admitted to hospital since last visit?    Has the patient done blood work or imaging since last visit?

## 2023-01-30 ENCOUNTER — LAB VISIT (OUTPATIENT)
Dept: LAB | Facility: HOSPITAL | Age: 88
End: 2023-01-30
Attending: INTERNAL MEDICINE
Payer: MEDICARE

## 2023-01-30 DIAGNOSIS — I10 PRIMARY HYPERTENSION: ICD-10-CM

## 2023-01-30 DIAGNOSIS — E78.5 HYPERLIPIDEMIA, UNSPECIFIED HYPERLIPIDEMIA TYPE: ICD-10-CM

## 2023-01-30 LAB
ANION GAP SERPL CALC-SCNC: 7 MEQ/L
BUN SERPL-MCNC: 26.8 MG/DL (ref 9.8–20.1)
CALCIUM SERPL-MCNC: 10.1 MG/DL (ref 8.4–10.2)
CHLORIDE SERPL-SCNC: 103 MMOL/L (ref 98–107)
CO2 SERPL-SCNC: 29 MMOL/L (ref 23–31)
CREAT SERPL-MCNC: 0.9 MG/DL (ref 0.55–1.02)
CREAT/UREA NIT SERPL: 30
GFR SERPLBLD CREATININE-BSD FMLA CKD-EPI: >60 MLS/MIN/1.73/M2
GLUCOSE SERPL-MCNC: 93 MG/DL (ref 82–115)
POTASSIUM SERPL-SCNC: 4.2 MMOL/L (ref 3.5–5.1)
SODIUM SERPL-SCNC: 139 MMOL/L (ref 136–145)

## 2023-01-30 PROCEDURE — 36415 COLL VENOUS BLD VENIPUNCTURE: CPT

## 2023-01-30 PROCEDURE — 80048 BASIC METABOLIC PNL TOTAL CA: CPT

## 2023-02-20 ENCOUNTER — OFFICE VISIT (OUTPATIENT)
Dept: URGENT CARE | Facility: CLINIC | Age: 88
End: 2023-02-20
Payer: MEDICARE

## 2023-02-20 VITALS
HEIGHT: 62 IN | SYSTOLIC BLOOD PRESSURE: 189 MMHG | WEIGHT: 137 LBS | BODY MASS INDEX: 25.21 KG/M2 | OXYGEN SATURATION: 98 % | DIASTOLIC BLOOD PRESSURE: 87 MMHG | TEMPERATURE: 98 F | HEART RATE: 62 BPM

## 2023-02-20 DIAGNOSIS — J00 NASOPHARYNGITIS: Primary | ICD-10-CM

## 2023-02-20 DIAGNOSIS — R05.9 COUGH, UNSPECIFIED TYPE: ICD-10-CM

## 2023-02-20 LAB
CTP QC/QA: YES
CTP QC/QA: YES
POC MOLECULAR INFLUENZA A AGN: NEGATIVE
POC MOLECULAR INFLUENZA B AGN: NEGATIVE
SARS-COV-2 RDRP RESP QL NAA+PROBE: NEGATIVE

## 2023-02-20 PROCEDURE — 87502 INFLUENZA DNA AMP PROBE: CPT | Mod: QW,,, | Performed by: FAMILY MEDICINE

## 2023-02-20 PROCEDURE — 99213 OFFICE O/P EST LOW 20 MIN: CPT | Mod: ,,, | Performed by: FAMILY MEDICINE

## 2023-02-20 PROCEDURE — 87635: ICD-10-PCS | Mod: QW,CR,, | Performed by: FAMILY MEDICINE

## 2023-02-20 PROCEDURE — 99213 PR OFFICE/OUTPT VISIT, EST, LEVL III, 20-29 MIN: ICD-10-PCS | Mod: ,,, | Performed by: FAMILY MEDICINE

## 2023-02-20 PROCEDURE — 87502 POCT INFLUENZA A/B MOLECULAR: ICD-10-PCS | Mod: QW,,, | Performed by: FAMILY MEDICINE

## 2023-02-20 PROCEDURE — 87635 SARS-COV-2 COVID-19 AMP PRB: CPT | Mod: QW,CR,, | Performed by: FAMILY MEDICINE

## 2023-02-20 RX ORDER — PREDNISONE 20 MG/1
20 TABLET ORAL 2 TIMES DAILY
Qty: 6 TABLET | Refills: 0 | Status: SHIPPED | OUTPATIENT
Start: 2023-02-20 | End: 2023-02-23

## 2023-02-20 NOTE — PATIENT INSTRUCTIONS
Flonase and saline nasal spray twice a day, antihistamine at bedtime.  Force fluids.  Prednisone with food if blood pressure improves to at least 150/85. Cough may linger a few weeks but should not have fever, chest pain, or shortness of breath.

## 2023-02-20 NOTE — PROGRESS NOTES
"Subjective:       Patient ID: Kiarra Arredondo is a 87 y.o. female.    Vitals:  height is 5' 2" (1.575 m) and weight is 62.1 kg (137 lb). Her temperature is 97.8 °F (36.6 °C). Her blood pressure is 189/87 (abnormal) and her pulse is 62. Her oxygen saturation is 98%.     Chief Complaint: Cough (Cough and runny nose x 3d)    3 days of rhinorrhea and cough. No fever. No focal sinusitis. No known exposures.       Constitution: Negative for chills, fatigue and fever.   HENT:  Positive for postnasal drip. Negative for congestion, sinus pressure and trouble swallowing.    Neck: Negative for neck pain and neck stiffness.   Cardiovascular:  Negative for chest pain, leg swelling and sob on exertion.   Respiratory:  Positive for cough. Negative for chest tightness, shortness of breath and wheezing.    Neurological:  Negative for dizziness, disorientation and altered mental status.   Psychiatric/Behavioral:  Negative for altered mental status and disorientation.      Objective:      Physical Exam   Constitutional: She is oriented to person, place, and time. She appears well-developed. No distress.   HENT:   Head: Normocephalic.   Ears:   Right Ear: Tympanic membrane and external ear normal.   Left Ear: Tympanic membrane and external ear normal.   Nose: Rhinorrhea present.   Mouth/Throat: Uvula is midline and mucous membranes are normal. No uvula swelling. Cobblestoning present. No oropharyngeal exudate or posterior oropharyngeal edema. Tonsils are 0 on the right. Tonsils are 0 on the left. No tonsillar exudate.   Eyes: Pupils are equal, round, and reactive to light. Right eye exhibits no discharge. Left eye exhibits no discharge.   Neck: Neck supple. No tracheal deviation present.   Cardiovascular: Normal rate, regular rhythm and normal heart sounds.   No murmur heard.  Pulmonary/Chest: Effort normal and breath sounds normal. No stridor. No respiratory distress. She has no wheezes.   Lymphadenopathy:     She has no cervical " adenopathy.   Neurological: no focal deficit. She is alert and oriented to person, place, and time.   Skin: Skin is warm and dry.   Psychiatric: Mood and thought content normal.   Nursing note and vitals reviewed.      Assessment:       1. Nasopharyngitis    2. Cough, unspecified type            Plan:         Nasopharyngitis  -     predniSONE (DELTASONE) 20 MG tablet; Take 1 tablet (20 mg total) by mouth 2 (two) times daily. for 3 days  Dispense: 6 tablet; Refill: 0    Cough, unspecified type  -     POCT COVID-19 Rapid Screening  -     POCT Influenza A/B MOLECULAR            COVID and Flu negative.

## 2023-03-15 ENCOUNTER — TELEPHONE (OUTPATIENT)
Dept: INTERNAL MEDICINE | Facility: CLINIC | Age: 88
End: 2023-03-15
Payer: MEDICARE

## 2023-03-15 NOTE — TELEPHONE ENCOUNTER
----- Message from Mindy Adan LPN sent at 3/15/2023  9:42 AM CDT -----  Regarding: BALDEMAR Rodriguez 3/22/23 @11:00a  Are there any outstanding tasks in the chart? no    Is there any documentation of tasks? no    Has patient seen another physician, been to the ER, C, or admitted to hospital since last visit?    Has the patient done blood work or imaging since last visit?

## 2023-03-22 ENCOUNTER — OFFICE VISIT (OUTPATIENT)
Dept: INTERNAL MEDICINE | Facility: CLINIC | Age: 88
End: 2023-03-22
Payer: MEDICARE

## 2023-03-22 VITALS
HEART RATE: 67 BPM | DIASTOLIC BLOOD PRESSURE: 80 MMHG | OXYGEN SATURATION: 97 % | WEIGHT: 142 LBS | RESPIRATION RATE: 18 BRPM | BODY MASS INDEX: 26.13 KG/M2 | HEIGHT: 62 IN | SYSTOLIC BLOOD PRESSURE: 176 MMHG

## 2023-03-22 DIAGNOSIS — I10 PRIMARY HYPERTENSION: ICD-10-CM

## 2023-03-22 PROCEDURE — 99214 OFFICE O/P EST MOD 30 MIN: CPT | Mod: ,,, | Performed by: INTERNAL MEDICINE

## 2023-03-22 PROCEDURE — 99214 PR OFFICE/OUTPT VISIT, EST, LEVL IV, 30-39 MIN: ICD-10-PCS | Mod: ,,, | Performed by: INTERNAL MEDICINE

## 2023-03-22 RX ORDER — HYDROCHLOROTHIAZIDE 25 MG/1
25 TABLET ORAL DAILY
Qty: 30 TABLET | Refills: 6 | Status: SHIPPED | OUTPATIENT
Start: 2023-03-22 | End: 2023-08-15 | Stop reason: SDUPTHER

## 2023-03-22 RX ORDER — CLONIDINE HYDROCHLORIDE 0.1 MG/1
0.1 TABLET ORAL EVERY 6 HOURS PRN
Qty: 30 TABLET | Refills: 1 | Status: SHIPPED | OUTPATIENT
Start: 2023-03-22 | End: 2023-08-15

## 2023-03-22 NOTE — ASSESSMENT & PLAN NOTE
BP is high today.  She's been under a lot of stress with selling her house.  Will increase the hctz, have her monitor her BP at home.  I'm also rx'ing clonidine to use as needed for an elevated blood pressure.  Her recent echo with Ruddy did show some LVH.  I also rec she switch to tylenol for pain control since aleve will elevate the bp.

## 2023-03-22 NOTE — PROGRESS NOTES
"Subjective:      Chief Complaint: Follow-up (6mo-discuss labs /She's under a lot of stress lately with selling her house)      HPI:  She is here for f/u htn.  She isn't checking her BP at home.  She is under a lot of stress -- she is selling her house.  She is taking aleve almost every night.  She only drinks 1 cup of coffee daily.  Denies headaches.  She did see the eye doctor for some double vision -- he rec she see the cardiologist.  She hasn't had any more problems.  Denies chest pain and shortness of breath.    Problem Noted   Gastroesophageal Reflux Disease 8/2/2022   Hypertension 8/2/2022   Mitral Valve Prolapse 8/2/2022   Hyperlipidemia 8/2/2022   Age-Related Cataract (Resolved) 8/2/2022        The patient's Health Maintenance was reviewed and the following appears to be due:   Health Maintenance Due   Topic Date Due    TETANUS VACCINE  Never done    Shingles Vaccine (1 of 2) Never done    COVID-19 Vaccine (3 - Booster for Pfizer series) 03/25/2021    Influenza Vaccine (1) 09/01/2022       Past Medical History:  Past Medical History:   Diagnosis Date    Age-related cataract 08/02/2022    GERD (gastroesophageal reflux disease)     HTN (hypertension)     Mitral valve prolapse      Review of patient's allergies indicates:   Allergen Reactions    Codeine      Other reaction(s): Dizzy     Current Outpatient Medications on File Prior to Visit   Medication Sig Dispense Refill    [DISCONTINUED] hydroCHLOROthiazide (HYDRODIURIL) 12.5 MG Tab Take 12.5 mg by mouth once daily.       No current facility-administered medications on file prior to visit.       Review of Systems    Objective:   BP (!) 176/80 (BP Location: Right arm, Patient Position: Sitting, BP Method: Small (Manual))   Pulse 67   Resp 18   Ht 5' 2.01" (1.575 m)   Wt 64.4 kg (142 lb)   SpO2 97%   BMI 25.97 kg/m²     Physical Exam  Vitals reviewed.   Constitutional:       General: She is not in acute distress.     Appearance: Normal appearance. She is " not ill-appearing or diaphoretic.   HENT:      Head: Normocephalic and atraumatic.   Neck:      Vascular: No carotid bruit.   Cardiovascular:      Rate and Rhythm: Normal rate and regular rhythm.      Pulses: Normal pulses.      Heart sounds: Normal heart sounds.   Pulmonary:      Effort: Pulmonary effort is normal.   Skin:     General: Skin is warm and dry.   Neurological:      General: No focal deficit present.      Mental Status: She is alert.   Psychiatric:         Mood and Affect: Mood normal.         Behavior: Behavior normal.         Thought Content: Thought content normal.         Judgment: Judgment normal.     Assessment and Plan:     Hypertension  BP is high today.  She's been under a lot of stress with selling her house.  Will increase the hctz, have her monitor her BP at home.  I'm also rx'ing clonidine to use as needed for an elevated blood pressure.  Her recent echo with Ruddy did show some LVH.  I also rec she switch to tylenol for pain control since aleve will elevate the bp.      Follow up in about 4 weeks (around 4/19/2023).    Medications Ordered This Encounter   Medications    cloNIDine (CATAPRES) 0.1 MG tablet     Sig: Take 1 tablet (0.1 mg total) by mouth every 6 (six) hours as needed (systolic is greater than 175 or the diastolic is greater 100).     Dispense:  30 tablet     Refill:  1     .    hydroCHLOROthiazide (HYDRODIURIL) 25 MG tablet     Sig: Take 1 tablet (25 mg total) by mouth once daily.     Dispense:  30 tablet     Refill:  6     .     [unfilled]  No orders of the defined types were placed in this encounter.

## 2023-04-11 ENCOUNTER — TELEPHONE (OUTPATIENT)
Dept: INTERNAL MEDICINE | Facility: CLINIC | Age: 88
End: 2023-04-11
Payer: MEDICARE

## 2023-04-11 NOTE — TELEPHONE ENCOUNTER
----- Message from Mindy Adan LPN sent at 4/11/2023  1:59 PM CDT -----  Regarding: BALDEMAR Rodriguez 4/18/23 @0840  Are there any outstanding tasks in the chart? no    Is there any documentation of tasks? no    Has patient seen another physician, been to the ER, [facility], or admitted to hospital since last visit?    Has the patient done blood work or imaging since last visit?

## 2023-04-18 ENCOUNTER — OFFICE VISIT (OUTPATIENT)
Dept: INTERNAL MEDICINE | Facility: CLINIC | Age: 88
End: 2023-04-18
Payer: MEDICARE

## 2023-04-18 VITALS
HEIGHT: 62 IN | BODY MASS INDEX: 26.31 KG/M2 | RESPIRATION RATE: 18 BRPM | HEART RATE: 62 BPM | OXYGEN SATURATION: 99 % | WEIGHT: 143 LBS | SYSTOLIC BLOOD PRESSURE: 170 MMHG | DIASTOLIC BLOOD PRESSURE: 78 MMHG

## 2023-04-18 DIAGNOSIS — I10 PRIMARY HYPERTENSION: Primary | ICD-10-CM

## 2023-04-18 PROCEDURE — 99213 PR OFFICE/OUTPT VISIT, EST, LEVL III, 20-29 MIN: ICD-10-PCS | Mod: ,,, | Performed by: INTERNAL MEDICINE

## 2023-04-18 PROCEDURE — 99213 OFFICE O/P EST LOW 20 MIN: CPT | Mod: ,,, | Performed by: INTERNAL MEDICINE

## 2023-04-18 RX ORDER — VALSARTAN 160 MG/1
160 TABLET ORAL DAILY
Qty: 90 TABLET | Refills: 3 | Status: SHIPPED | OUTPATIENT
Start: 2023-04-18 | End: 2023-05-09 | Stop reason: SDUPTHER

## 2023-04-18 NOTE — PROGRESS NOTES
"Subjective:      Chief Complaint: Follow-up (4wk)      HPI: She is here for f/u htn.  She brought a log of her pressures, and its running 170-180/70-80s at home pretty consistently.    Problem Noted   Gastroesophageal Reflux Disease 8/2/2022   Hypertension 8/2/2022   Mitral Valve Prolapse 8/2/2022   Hyperlipidemia 8/2/2022   Age-Related Cataract (Resolved) 8/2/2022        The patient's Health Maintenance was reviewed and the following appears to be due:   Health Maintenance Due   Topic Date Due    TETANUS VACCINE  Never done    Shingles Vaccine (1 of 2) Never done    COVID-19 Vaccine (3 - Booster for Pfizer series) 03/25/2021    Influenza Vaccine (1) 09/01/2022       Past Medical History:  Past Medical History:   Diagnosis Date    Age-related cataract 08/02/2022    GERD (gastroesophageal reflux disease)     HTN (hypertension)     Mitral valve prolapse      Review of patient's allergies indicates:   Allergen Reactions    Codeine      Other reaction(s): Dizzy     Current Outpatient Medications on File Prior to Visit   Medication Sig Dispense Refill    cloNIDine (CATAPRES) 0.1 MG tablet Take 1 tablet (0.1 mg total) by mouth every 6 (six) hours as needed (systolic is greater than 175 or the diastolic is greater 100). 30 tablet 1    hydroCHLOROthiazide (HYDRODIURIL) 25 MG tablet Take 1 tablet (25 mg total) by mouth once daily. 30 tablet 6     No current facility-administered medications on file prior to visit.       Review of Systems    Objective:   BP (!) 170/78 (BP Location: Right arm, Patient Position: Sitting, BP Method: Small (Manual))   Pulse 62   Resp 18   Ht 5' 2.01" (1.575 m)   Wt 64.9 kg (143 lb)   SpO2 99%   BMI 26.15 kg/m²     Physical Exam  Vitals reviewed.   Constitutional:       General: She is not in acute distress.     Appearance: Normal appearance. She is not ill-appearing or diaphoretic.   HENT:      Head: Normocephalic and atraumatic.   Pulmonary:      Effort: Pulmonary effort is normal. "   Skin:     General: Skin is warm and dry.   Neurological:      General: No focal deficit present.      Mental Status: She is alert.   Psychiatric:         Mood and Affect: Mood normal.         Behavior: Behavior normal.         Thought Content: Thought content normal.         Judgment: Judgment normal.     Assessment and Plan:     Hypertension  Add diovan 160 mg daily.  Cont hctz.  Recheck 1 mos with preclinic bmp.      Follow up in about 4 weeks (around 5/16/2023).    Medications Ordered This Encounter   Medications    valsartan (DIOVAN) 160 MG tablet     Sig: Take 1 tablet (160 mg total) by mouth once daily.     Dispense:  90 tablet     Refill:  3     .     [unfilled]  Orders Placed This Encounter   Procedures    Basic Metabolic Panel     Standing Status:   Future     Standing Expiration Date:   6/16/2024

## 2023-05-02 ENCOUNTER — TELEPHONE (OUTPATIENT)
Dept: INTERNAL MEDICINE | Facility: CLINIC | Age: 88
End: 2023-05-02
Payer: MEDICARE

## 2023-05-02 NOTE — TELEPHONE ENCOUNTER
----- Message from Mindy Adan LPN sent at 5/2/2023  8:41 AM CDT -----  Regarding: BALDEMAR Rodriguez 5/9/23 @10:20a  Are there any outstanding tasks in the chart? Pt will need nonfasting labs    Is there any documentation of tasks? no    Has patient seen another physician, been to the ER, [facility], or admitted to hospital since last visit?    Has the patient done blood work or imaging since last visit?

## 2023-05-03 ENCOUNTER — LAB VISIT (OUTPATIENT)
Dept: LAB | Facility: HOSPITAL | Age: 88
End: 2023-05-03
Attending: INTERNAL MEDICINE
Payer: MEDICARE

## 2023-05-03 DIAGNOSIS — I10 PRIMARY HYPERTENSION: ICD-10-CM

## 2023-05-03 LAB
ANION GAP SERPL CALC-SCNC: 8 MEQ/L
BUN SERPL-MCNC: 21.3 MG/DL (ref 9.8–20.1)
CALCIUM SERPL-MCNC: 10.1 MG/DL (ref 8.4–10.2)
CHLORIDE SERPL-SCNC: 102 MMOL/L (ref 98–107)
CO2 SERPL-SCNC: 28 MMOL/L (ref 23–31)
CREAT SERPL-MCNC: 1.09 MG/DL (ref 0.55–1.02)
CREAT/UREA NIT SERPL: 20
GFR SERPLBLD CREATININE-BSD FMLA CKD-EPI: 49 MLS/MIN/1.73/M2
GLUCOSE SERPL-MCNC: 102 MG/DL (ref 82–115)
POTASSIUM SERPL-SCNC: 5.1 MMOL/L (ref 3.5–5.1)
SODIUM SERPL-SCNC: 138 MMOL/L (ref 136–145)

## 2023-05-03 PROCEDURE — 80048 BASIC METABOLIC PNL TOTAL CA: CPT

## 2023-05-03 PROCEDURE — 36415 COLL VENOUS BLD VENIPUNCTURE: CPT

## 2023-05-09 ENCOUNTER — OFFICE VISIT (OUTPATIENT)
Dept: INTERNAL MEDICINE | Facility: CLINIC | Age: 88
End: 2023-05-09
Payer: MEDICARE

## 2023-05-09 VITALS
SYSTOLIC BLOOD PRESSURE: 160 MMHG | HEART RATE: 61 BPM | RESPIRATION RATE: 18 BRPM | WEIGHT: 141 LBS | OXYGEN SATURATION: 97 % | HEIGHT: 62 IN | DIASTOLIC BLOOD PRESSURE: 70 MMHG | BODY MASS INDEX: 25.95 KG/M2

## 2023-05-09 DIAGNOSIS — I10 PRIMARY HYPERTENSION: Primary | ICD-10-CM

## 2023-05-09 PROCEDURE — 99213 OFFICE O/P EST LOW 20 MIN: CPT | Mod: ,,, | Performed by: INTERNAL MEDICINE

## 2023-05-09 PROCEDURE — 99213 PR OFFICE/OUTPT VISIT, EST, LEVL III, 20-29 MIN: ICD-10-PCS | Mod: ,,, | Performed by: INTERNAL MEDICINE

## 2023-05-09 RX ORDER — VALSARTAN 320 MG/1
320 TABLET ORAL DAILY
Qty: 90 TABLET | Refills: 3 | Status: SHIPPED | OUTPATIENT
Start: 2023-05-09 | End: 2023-10-31

## 2023-05-09 NOTE — ASSESSMENT & PLAN NOTE
BP not at goal.  Will increase the diovan to 320 mg.  Cont hctz.  Recheck 6 weeks.  I reminded her that she can use the clonidine prn for higher pressures.

## 2023-05-09 NOTE — PROGRESS NOTES
"Subjective:      Chief Complaint: Follow-up (4wk- discuss labs )      HPI:  She ishere for htn.  She is moving tomorrow.  She brought her log, and her BP has been 170s-180s/70s-80s.  No issues with the meds.    Problem Noted   Gastroesophageal Reflux Disease 8/2/2022   Hypertension 8/2/2022   Mitral Valve Prolapse 8/2/2022   Hyperlipidemia 8/2/2022   Age-Related Cataract (Resolved) 8/2/2022        The patient's Health Maintenance was reviewed and the following appears to be due:   Health Maintenance Due   Topic Date Due    TETANUS VACCINE  Never done    Shingles Vaccine (1 of 2) Never done    COVID-19 Vaccine (3 - Booster for Pfizer series) 03/25/2021       Past Medical History:  Past Medical History:   Diagnosis Date    Age-related cataract 08/02/2022    GERD (gastroesophageal reflux disease)     HTN (hypertension)     Mitral valve prolapse      Review of patient's allergies indicates:   Allergen Reactions    Codeine      Other reaction(s): Dizzy     Current Outpatient Medications on File Prior to Visit   Medication Sig Dispense Refill    cloNIDine (CATAPRES) 0.1 MG tablet Take 1 tablet (0.1 mg total) by mouth every 6 (six) hours as needed (systolic is greater than 175 or the diastolic is greater 100). 30 tablet 1    hydroCHLOROthiazide (HYDRODIURIL) 25 MG tablet Take 1 tablet (25 mg total) by mouth once daily. 30 tablet 6    [DISCONTINUED] valsartan (DIOVAN) 160 MG tablet Take 1 tablet (160 mg total) by mouth once daily. 90 tablet 3     No current facility-administered medications on file prior to visit.       Review of Systems    Objective:   BP (!) 160/70 (BP Location: Left arm, Patient Position: Sitting, BP Method: Small (Manual))   Pulse 61   Resp 18   Ht 5' 2.01" (1.575 m)   Wt 64 kg (141 lb)   SpO2 97%   BMI 25.78 kg/m²     Physical Exam  Vitals reviewed.   Constitutional:       General: She is not in acute distress.     Appearance: Normal appearance. She is not ill-appearing or diaphoretic.   HENT:    "   Head: Normocephalic and atraumatic.   Pulmonary:      Effort: Pulmonary effort is normal.   Skin:     General: Skin is warm and dry.   Neurological:      General: No focal deficit present.      Mental Status: She is alert.   Psychiatric:         Mood and Affect: Mood normal.         Behavior: Behavior normal.         Thought Content: Thought content normal.         Judgment: Judgment normal.     Assessment and Plan:     Hypertension  BP not at goal.  Will increase the diovan to 320 mg.  Cont hctz.  Recheck 6 weeks.  I reminded her that she can use the clonidine prn for higher pressures.      Follow up in about 6 weeks (around 6/20/2023).    Medications Ordered This Encounter   Medications    valsartan (DIOVAN) 320 MG tablet     Sig: Take 1 tablet (320 mg total) by mouth once daily.     Dispense:  90 tablet     Refill:  3     .     [unfilled]  No orders of the defined types were placed in this encounter.

## 2023-05-14 ENCOUNTER — OFFICE VISIT (OUTPATIENT)
Dept: URGENT CARE | Facility: CLINIC | Age: 88
End: 2023-05-14
Payer: MEDICARE

## 2023-05-14 VITALS
RESPIRATION RATE: 17 BRPM | OXYGEN SATURATION: 97 % | DIASTOLIC BLOOD PRESSURE: 80 MMHG | BODY MASS INDEX: 25.95 KG/M2 | TEMPERATURE: 99 F | WEIGHT: 141 LBS | HEART RATE: 84 BPM | SYSTOLIC BLOOD PRESSURE: 195 MMHG | HEIGHT: 62 IN

## 2023-05-14 DIAGNOSIS — J34.89 INFECTION OF NOSE: Primary | ICD-10-CM

## 2023-05-14 PROCEDURE — 99213 PR OFFICE/OUTPT VISIT, EST, LEVL III, 20-29 MIN: ICD-10-PCS | Mod: S$PBB,,, | Performed by: FAMILY MEDICINE

## 2023-05-14 PROCEDURE — 99213 OFFICE O/P EST LOW 20 MIN: CPT | Mod: S$PBB,,, | Performed by: FAMILY MEDICINE

## 2023-05-14 RX ORDER — DOXYCYCLINE HYCLATE 100 MG
100 TABLET ORAL 2 TIMES DAILY
Qty: 10 TABLET | Refills: 0 | Status: SHIPPED | OUTPATIENT
Start: 2023-05-14 | End: 2023-05-19

## 2023-05-14 RX ORDER — MUPIROCIN 20 MG/G
OINTMENT TOPICAL 3 TIMES DAILY
Qty: 22 G | Refills: 0 | Status: SHIPPED | OUTPATIENT
Start: 2023-05-14 | End: 2023-05-19

## 2023-05-14 NOTE — PROGRESS NOTES
Patient ID: [id number]     Chief Complaint: upper respiratory tract infection symptoms    History of Present Illness:     Kiarra Arredondo is a 87 y.o. female  who presents today for symptoms of Other (2 days sore in right nostril. Redness to tip of nose. Tried applying vaseline. )    87-year-old female with a history of hypertension, hyperlipidemia, mitral valve prolapse, GERD, cataracts, status post sinus surgery, presents today with      Pt denies experiencing any fevers, chills, nausea, vomiting, difficulty breathing, dysphagia, or neck stiffness.    Past Medical History:     ----------------------------  Age-related cataract  GERD (gastroesophageal reflux disease)  HTN (hypertension)  Mitral valve prolapse     Past Surgical History:   Procedure Laterality Date    CATARACT EXTRACTION      COLONOSCOPY      EYE SURGERY  04/2022    SINUS SURGERY      TOTAL ABDOMINAL HYSTERECTOMY W/ BILATERAL SALPINGOOPHORECTOMY         Review of patient's allergies indicates:   Allergen Reactions    Codeine      Other reaction(s): Dizzy       Outpatient Medications Marked as Taking for the 5/14/23 encounter (Office Visit) with Mohit Giron MD   Medication Sig Dispense Refill    cloNIDine (CATAPRES) 0.1 MG tablet Take 1 tablet (0.1 mg total) by mouth every 6 (six) hours as needed (systolic is greater than 175 or the diastolic is greater 100). 30 tablet 1    hydroCHLOROthiazide (HYDRODIURIL) 25 MG tablet Take 1 tablet (25 mg total) by mouth once daily. 30 tablet 6    valsartan (DIOVAN) 320 MG tablet Take 1 tablet (320 mg total) by mouth once daily. 90 tablet 3       Social History     Socioeconomic History    Marital status:    Tobacco Use    Smoking status: Never    Smokeless tobacco: Never   Substance and Sexual Activity    Alcohol use: Yes     Alcohol/week: 4.0 standard drinks     Types: 4 Glasses of wine per week    Drug use: Never    Sexual activity: Never        Family History   Problem Relation Age of Onset     "Atrial fibrillation Mother     Heart failure Father         Subjective:     Review of Systems   Constitutional:  Negative for chills, fever and malaise/fatigue.   HENT:  Negative for congestion, ear discharge, ear pain, sinus pain and sore throat.    Respiratory:  Negative for cough, sputum production, shortness of breath, wheezing and stridor.    Gastrointestinal:  Negative for abdominal pain, diarrhea, nausea and vomiting.   Genitourinary:  Negative for dysuria, frequency and urgency.   Musculoskeletal:  Negative for neck pain.   Skin:  Negative for rash.        Painful erythematous exterior nose, and right anterior nostril   Neurological:  Negative for headaches.     Objective:     BP (!) 195/80   Pulse 84   Temp 99 °F (37.2 °C)   Resp 17   Ht 5' 2" (1.575 m)   Wt 64 kg (141 lb)   SpO2 97%   BMI 25.79 kg/m²     Physical Exam  Constitutional:       General: She is not in acute distress.     Appearance: Normal appearance. She is not ill-appearing or toxic-appearing.   HENT:      Head: Normocephalic and atraumatic.      Right Ear: Tympanic membrane and ear canal normal.      Left Ear: Tympanic membrane and ear canal normal.      Nose: No congestion or rhinorrhea.      Comments: Erythematous blanching exterior nose, erythematous right internal nostril, no obvious lesions seen     Mouth/Throat:      Pharynx: Oropharynx is clear. No oropharyngeal exudate or posterior oropharyngeal erythema.   Eyes:      General:         Right eye: No discharge.         Left eye: No discharge.      Extraocular Movements: Extraocular movements intact.      Conjunctiva/sclera: Conjunctivae normal.   Cardiovascular:      Rate and Rhythm: Normal rate and regular rhythm.      Heart sounds: Normal heart sounds. No murmur heard.    No gallop.   Pulmonary:      Effort: Pulmonary effort is normal. No respiratory distress.      Breath sounds: Normal breath sounds. No stridor. No wheezing, rhonchi or rales.   Chest:      Chest wall: No " tenderness.   Musculoskeletal:      Cervical back: No rigidity or tenderness.   Lymphadenopathy:      Cervical: No cervical adenopathy.   Neurological:      Mental Status: She is alert and oriented to person, place, and time. Mental status is at baseline.   Psychiatric:         Mood and Affect: Mood normal.         Behavior: Behavior normal.       Assessment & Plan:       ICD-10-CM ICD-9-CM   1. Infection of nose  J34.89 478.19        1. Infection of nose  -     mupirocin (BACTROBAN) 2 % ointment; Apply topically 3 (three) times daily. for 5 days  Dispense: 22 g; Refill: 0  -     doxycycline (VIBRA-TABS) 100 MG tablet; Take 1 tablet (100 mg total) by mouth 2 (two) times daily. for 5 days  Dispense: 10 tablet; Refill: 0         Antibiotics twice daily for 5 days, both oral and topical.  Return if fevers or change in symptoms

## 2023-05-14 NOTE — PATIENT INSTRUCTIONS
Please take both doxycycline and the topical mupirocin twice daily for 5 days.    Please return if you develop any other symptoms, including fever are changes in vision

## 2023-06-19 NOTE — PROGRESS NOTES
"Subjective:      Patient ID: Kiarra Arredondo is a 87 y.o. female.    Chief Complaint: Follow-up (Pt is here for 6 week f/u on BP and pt states it is still high. )      HPI: Patient here today for 6 week follow up of HTN. At LOV with Dr. Rodriguez, inc in Diovan to 320 mg daily. She seems to have gotten confused with medication and only took Diovan this AM.  Denies CP, palpitations, or SOB. She reports recent inc in stress with selling home, moving, etc.      Review of patient's allergies indicates:   Allergen Reactions    Codeine      Other reaction(s): Dizzy       Review of Systems  Constitutional: No fatigue, No weight loss.  Eyes: No blurring.  Respiratory: No shortness of breath, No exertional dyspnea.   Cardiovascular: No chest pain, No palpitations  Neurologic: No altered mental status, No headaches.  Psychiatrics: anxiety,No depression, No SI/HI.  Objective:   Visit Vitals  BP (!) 196/80   Pulse 73   Resp 14   Ht 5' 2" (1.575 m)   Wt 65.8 kg (145 lb)   SpO2 95%   BMI 26.52 kg/m²     The patient's weight trend is below:   Wt Readings from Last 4 Encounters:   06/20/23 65.8 kg (145 lb)   05/14/23 64 kg (141 lb)   05/09/23 64 kg (141 lb)   04/18/23 64.9 kg (143 lb)        Physical Exam  Vitals and nursing note reviewed.   Constitutional:       General: She is not in acute distress.     Appearance: Normal appearance. She is normal weight. She is not ill-appearing, toxic-appearing or diaphoretic.   HENT:      Head: Normocephalic and atraumatic.   Cardiovascular:      Rate and Rhythm: Normal rate and regular rhythm.      Heart sounds: Normal heart sounds.   Pulmonary:      Effort: Pulmonary effort is normal.      Breath sounds: Normal breath sounds.   Neurological:      General: No focal deficit present.      Mental Status: She is alert and oriented to person, place, and time. Mental status is at baseline.       Assessment/Plan:   1. Primary hypertension  HYPERTENSION RECOMMENDATIONS:  Continue current treatment " regimen.  Medication changes per orders.  Dietary sodium restriction.  Regular aerobic exercise.  Reduce stress.  Goal BP <130/80    - amLODIPine (NORVASC) 5 MG tablet; Take 1 tablet (5 mg total) by mouth once daily.  Dispense: 30 tablet; Refill: 11    2. WINTER (generalized anxiety disorder)  Discussed possible addition of Lexapro-advised of purpose, s/e, and benefits of med  She deferred today and will f/u as needed      Medication List with Changes/Refills   New Medications    AMLODIPINE (NORVASC) 5 MG TABLET    Take 1 tablet (5 mg total) by mouth once daily.   Current Medications    CLONIDINE (CATAPRES) 0.1 MG TABLET    Take 1 tablet (0.1 mg total) by mouth every 6 (six) hours as needed (systolic is greater than 175 or the diastolic is greater 100).    HYDROCHLOROTHIAZIDE (HYDRODIURIL) 25 MG TABLET    Take 1 tablet (25 mg total) by mouth once daily.    VALSARTAN (DIOVAN) 320 MG TABLET    Take 1 tablet (320 mg total) by mouth once daily.        Follow up in about 6 weeks (around 8/1/2023) for With Dr. Rodriguez, Blood Pressure Recheck.    Chemistry:  Lab Results   Component Value Date     05/03/2023    K 5.1 05/03/2023    CHLORIDE 102 05/03/2023    BUN 21.3 (H) 05/03/2023    CREATININE 1.09 (H) 05/03/2023    EGFRNORACEVR 49 05/03/2023    GLUCOSE 102 05/03/2023    CALCIUM 10.1 05/03/2023    ALKPHOS 69 07/28/2022    LABPROT 6.8 07/28/2022    ALBUMIN 4.0 07/28/2022    BILIDIR 0.3 12/02/2021    IBILI 0.70 12/02/2021    AST 20 07/28/2022    ALT 15 07/28/2022    [id number] 67.8 08/09/2021        No results found for: HGBA1C, MICROALBCREA     Hematology:  Lab Results   Component Value Date    WBC 4.8 07/28/2022    HGB 14.1 07/28/2022    HCT 43.2 07/28/2022     07/28/2022       Lipid Panel:  Lab Results   Component Value Date    CHOL 231 (H) 07/28/2022    HDL 75 (H) 07/28/2022    .00 07/28/2022    TRIG 91 07/28/2022    TOTALCHOLEST 3 07/28/2022        Urine:  Lab Results   Component Value Date     APPEARANCEUA CLEAR 12/02/2021    PROTEINUA Negative 12/02/2021    LEUKOCYTESUR Negative 12/02/2021    RBCUA NONE SEEN 12/02/2021    WBCUA NONE SEEN 12/02/2021    BACTERIA NONE SEEN 12/02/2021

## 2023-06-20 ENCOUNTER — OFFICE VISIT (OUTPATIENT)
Dept: INTERNAL MEDICINE | Facility: CLINIC | Age: 88
End: 2023-06-20
Payer: MEDICARE

## 2023-06-20 VITALS
OXYGEN SATURATION: 95 % | WEIGHT: 145 LBS | RESPIRATION RATE: 14 BRPM | DIASTOLIC BLOOD PRESSURE: 80 MMHG | BODY MASS INDEX: 26.68 KG/M2 | HEIGHT: 62 IN | HEART RATE: 73 BPM | SYSTOLIC BLOOD PRESSURE: 196 MMHG

## 2023-06-20 DIAGNOSIS — F41.1 GAD (GENERALIZED ANXIETY DISORDER): ICD-10-CM

## 2023-06-20 DIAGNOSIS — I10 PRIMARY HYPERTENSION: Primary | ICD-10-CM

## 2023-06-20 PROCEDURE — 99214 PR OFFICE/OUTPT VISIT, EST, LEVL IV, 30-39 MIN: ICD-10-PCS | Mod: ,,, | Performed by: NURSE PRACTITIONER

## 2023-06-20 PROCEDURE — 99214 OFFICE O/P EST MOD 30 MIN: CPT | Mod: ,,, | Performed by: NURSE PRACTITIONER

## 2023-06-20 RX ORDER — AMLODIPINE BESYLATE 5 MG/1
5 TABLET ORAL DAILY
Qty: 30 TABLET | Refills: 11 | Status: SHIPPED | OUTPATIENT
Start: 2023-06-20 | End: 2023-10-31

## 2023-08-15 ENCOUNTER — OFFICE VISIT (OUTPATIENT)
Dept: INTERNAL MEDICINE | Facility: CLINIC | Age: 88
End: 2023-08-15
Payer: MEDICARE

## 2023-08-15 VITALS
SYSTOLIC BLOOD PRESSURE: 164 MMHG | WEIGHT: 143 LBS | RESPIRATION RATE: 16 BRPM | HEIGHT: 62 IN | HEART RATE: 60 BPM | BODY MASS INDEX: 26.31 KG/M2 | DIASTOLIC BLOOD PRESSURE: 84 MMHG | OXYGEN SATURATION: 97 %

## 2023-08-15 DIAGNOSIS — G89.29 CHRONIC PAIN OF RIGHT KNEE: ICD-10-CM

## 2023-08-15 DIAGNOSIS — M25.561 CHRONIC PAIN OF RIGHT KNEE: ICD-10-CM

## 2023-08-15 DIAGNOSIS — I10 PRIMARY HYPERTENSION: Primary | ICD-10-CM

## 2023-08-15 PROCEDURE — 99214 PR OFFICE/OUTPT VISIT, EST, LEVL IV, 30-39 MIN: ICD-10-PCS | Mod: ,,, | Performed by: NURSE PRACTITIONER

## 2023-08-15 PROCEDURE — 99214 OFFICE O/P EST MOD 30 MIN: CPT | Mod: ,,, | Performed by: NURSE PRACTITIONER

## 2023-08-15 RX ORDER — HYDROCHLOROTHIAZIDE 25 MG/1
25 TABLET ORAL DAILY
Qty: 30 TABLET | Refills: 6 | Status: SHIPPED | OUTPATIENT
Start: 2023-08-15 | End: 2023-10-31

## 2023-08-15 RX ORDER — CELECOXIB 100 MG/1
100 CAPSULE ORAL 2 TIMES DAILY
Qty: 30 CAPSULE | Refills: 0 | Status: SHIPPED | OUTPATIENT
Start: 2023-08-15 | End: 2024-01-08

## 2023-08-15 NOTE — PROGRESS NOTES
"Subjective:      Patient ID: Kiarra Arredondo is a 87 y.o. female.    Chief Complaint: [employer] (Pt is here for 6 week f/u on HTN, pt has been gone for 2 weeks and did not check it but before that it was running good. Pt wants to know which ones to continue on they gave her 4 and it is too much. Pt has knee pain and it is worse in right knee that it is bone on bone and she tried Celebrex and it helped a lot and wants to talk about a rx)      HPI:  Patient here today for 6 week HTN recheck. At LOV, she was encouraged compliance with Diovan at 320mg and RX of amlodipine 5mg added. Some discussion of anxiety, which she declined Lexapro at that time. She states that she is feeling great.  She did stop HCTZ because she was concerned about being on so many medication.  Bp slightly elevated. She does not routinely monitor. No issues      Review of patient's allergies indicates:   Allergen Reactions    Codeine      Other reaction(s): Dizzy       Review of Systems  Constitutional: No fatigue  Respiratory: No shortness of breath, No exertional dyspnea.   Cardiovascular: No chest pain, No palpitations, No peripheral edema.  Psychiatrics: No anxiety,No depression, No SI/HI.  Objective:   Visit Vitals  BP (!) 164/84   Pulse 60   Resp 16   Ht 5' 2" (1.575 m)   Wt 64.9 kg (143 lb)   SpO2 97%   BMI 26.16 kg/m²     The patient's weight trend is below:   Wt Readings from Last 4 Encounters:   08/15/23 64.9 kg (143 lb)   06/20/23 65.8 kg (145 lb)   05/14/23 64 kg (141 lb)   05/09/23 64 kg (141 lb)        Physical Exam  Vitals and nursing note reviewed.   Constitutional:       General: She is not in acute distress.     Appearance: Normal appearance. She is normal weight. She is not ill-appearing, toxic-appearing or diaphoretic.   HENT:      Head: Normocephalic and atraumatic.   Cardiovascular:      Rate and Rhythm: Normal rate and regular rhythm.      Heart sounds: Normal heart sounds.   Pulmonary:      Effort: Pulmonary effort " is normal.      Breath sounds: Normal breath sounds.   Musculoskeletal:         General: Normal range of motion.   Neurological:      General: No focal deficit present.      Mental Status: She is alert and oriented to person, place, and time. Mental status is at baseline.         Assessment/Plan:   1. Primary hypertension  HYPERTENSION RECOMMENDATIONS:  Continue current treatment regimen.  Dietary sodium restriction.  Regular aerobic exercise.  Reduce stress.  Goal BP <130/80  F/u with Dr. Rodriguez in 6 weeks with BP log  Encouraged compliance with all meds RX-written guidance provided    - hydroCHLOROthiazide (HYDRODIURIL) 25 MG tablet; Take 1 tablet (25 mg total) by mouth once daily.  Dispense: 30 tablet; Refill: 6    2. Chronic pain of right knee  Follow up with specialist that is also caring for this problem.  RX Celebrex-advised of possible s/e, benefits and purpose of med  Take with food    - celecoxib (CELEBREX) 100 MG capsule; Take 1 capsule (100 mg total) by mouth 2 (two) times daily.  Dispense: 30 capsule; Refill: 0      Medication List with Changes/Refills   New Medications    CELECOXIB (CELEBREX) 100 MG CAPSULE    Take 1 capsule (100 mg total) by mouth 2 (two) times daily.   Current Medications    AMLODIPINE (NORVASC) 5 MG TABLET    Take 1 tablet (5 mg total) by mouth once daily.    VALSARTAN (DIOVAN) 320 MG TABLET    Take 1 tablet (320 mg total) by mouth once daily.   Changed and/or Refilled Medications    Modified Medication Previous Medication    HYDROCHLOROTHIAZIDE (HYDRODIURIL) 25 MG TABLET hydroCHLOROthiazide (HYDRODIURIL) 25 MG tablet       Take 1 tablet (25 mg total) by mouth once daily.    Take 1 tablet (25 mg total) by mouth once daily.   Discontinued Medications    CLONIDINE (CATAPRES) 0.1 MG TABLET    Take 1 tablet (0.1 mg total) by mouth every 6 (six) hours as needed (systolic is greater than 175 or the diastolic is greater 100).        Follow up in about 6 weeks (around 9/26/2023) for With   "Jennifer, Blood Pressure Recheck.    Chemistry:  Lab Results   Component Value Date     05/03/2023    K 5.1 05/03/2023    CHLORIDE 102 05/03/2023    BUN 21.3 (H) 05/03/2023    CREATININE 1.09 (H) 05/03/2023    EGFRNORACEVR 49 05/03/2023    GLUCOSE 102 05/03/2023    CALCIUM 10.1 05/03/2023    ALKPHOS 69 07/28/2022    LABPROT 6.8 07/28/2022    ALBUMIN 4.0 07/28/2022    BILIDIR 0.3 12/02/2021    IBILI 0.70 12/02/2021    AST 20 07/28/2022    ALT 15 07/28/2022    [id number] 67.8 08/09/2021        No results found for: "HGBA1C", "MICROALBCREA"     Hematology:  Lab Results   Component Value Date    WBC 4.8 07/28/2022    HGB 14.1 07/28/2022    HCT 43.2 07/28/2022     07/28/2022       Lipid Panel:  Lab Results   Component Value Date    CHOL 231 (H) 07/28/2022    HDL 75 (H) 07/28/2022    .00 07/28/2022    TRIG 91 07/28/2022    TOTALCHOLEST 3 07/28/2022        Urine:  Lab Results   Component Value Date    APPEARANCEUA CLEAR 12/02/2021    PROTEINUA Negative 12/02/2021    LEUKOCYTESUR Negative 12/02/2021    RBCUA NONE SEEN 12/02/2021    WBCUA NONE SEEN 12/02/2021    BACTERIA NONE SEEN 12/02/2021        "

## 2023-10-02 ENCOUNTER — TELEPHONE (OUTPATIENT)
Dept: INTERNAL MEDICINE | Facility: CLINIC | Age: 88
End: 2023-10-02
Payer: MEDICARE

## 2023-10-02 NOTE — TELEPHONE ENCOUNTER
----- Message from Mindy Adan LPN sent at 10/2/2023  9:25 AM CDT -----  Regarding: BALDEMAR Rodriguez 10/9/23 @11:00a  Are there any outstanding tasks in the chart? no    Is there any documentation of tasks? no    Has patient seen another physician, been to the ER, C, or admitted to hospital since last visit?    Has the patient done blood work or imaging since last visit?

## 2023-10-24 ENCOUNTER — TELEPHONE (OUTPATIENT)
Dept: INTERNAL MEDICINE | Facility: CLINIC | Age: 88
End: 2023-10-24
Payer: MEDICARE

## 2023-10-24 NOTE — TELEPHONE ENCOUNTER
----- Message from Mindy Adan LPN sent at 10/24/2023 10:26 AM CDT -----  Regarding: BALDEMAR Rodriguez 10/31/23 @11:00a  Are there any outstanding tasks in the chart? no    Is there any documentation of tasks? no    Has patient seen another physician, been to the ER, C, or admitted to hospital since last visit?    Has the patient done blood work or imaging since last visit?

## 2023-10-31 ENCOUNTER — OFFICE VISIT (OUTPATIENT)
Dept: INTERNAL MEDICINE | Facility: CLINIC | Age: 88
End: 2023-10-31
Payer: MEDICARE

## 2023-10-31 VITALS
RESPIRATION RATE: 18 BRPM | HEART RATE: 67 BPM | WEIGHT: 146 LBS | SYSTOLIC BLOOD PRESSURE: 198 MMHG | OXYGEN SATURATION: 96 % | HEIGHT: 62 IN | BODY MASS INDEX: 26.87 KG/M2 | DIASTOLIC BLOOD PRESSURE: 80 MMHG

## 2023-10-31 DIAGNOSIS — I10 PRIMARY HYPERTENSION: ICD-10-CM

## 2023-10-31 DIAGNOSIS — Z23 FLU VACCINE NEED: Primary | ICD-10-CM

## 2023-10-31 PROCEDURE — 90694 FLU VACCINE - QUADRIVALENT - ADJUVANTED: ICD-10-PCS | Mod: ,,, | Performed by: INTERNAL MEDICINE

## 2023-10-31 PROCEDURE — G0008 ADMIN INFLUENZA VIRUS VAC: HCPCS | Mod: ,,, | Performed by: INTERNAL MEDICINE

## 2023-10-31 PROCEDURE — 99214 PR OFFICE/OUTPT VISIT, EST, LEVL IV, 30-39 MIN: ICD-10-PCS | Mod: 25,,, | Performed by: INTERNAL MEDICINE

## 2023-10-31 PROCEDURE — G0008 FLU VACCINE - QUADRIVALENT - ADJUVANTED: ICD-10-PCS | Mod: ,,, | Performed by: INTERNAL MEDICINE

## 2023-10-31 PROCEDURE — 99214 OFFICE O/P EST MOD 30 MIN: CPT | Mod: 25,,, | Performed by: INTERNAL MEDICINE

## 2023-10-31 PROCEDURE — 90694 VACC AIIV4 NO PRSRV 0.5ML IM: CPT | Mod: ,,, | Performed by: INTERNAL MEDICINE

## 2023-10-31 RX ORDER — VALSARTAN AND HYDROCHLOROTHIAZIDE 320; 25 MG/1; MG/1
1 TABLET, FILM COATED ORAL DAILY
Qty: 90 TABLET | Refills: 3 | Status: SHIPPED | OUTPATIENT
Start: 2023-10-31 | End: 2024-10-30

## 2023-10-31 NOTE — ASSESSMENT & PLAN NOTE
"IN further discussion with Ms. Arredondo and confirmation with the pharmacist, she was only taking valsartan 160 mg daily.  She reported being afraid to take "all those meds".  Will change her to diovan hct 320/25 mg daily and have her f/u in 1 mo.  She does also have the clonidine for prn use.  "

## 2023-10-31 NOTE — PROGRESS NOTES
Subjective:      Chief Complaint: Follow-up (6wk)      HPI:She is here for f/u htn.  She hasn't been checking her BP recently.  But she is very stressed out because her granduaghter is in the ER tis morning.  Energy level is good.  Denies headaches.    Although initially reporting compliance with all 3 meds, I contacted the pharmacist who states the only medication recently filled was valsartan 160 mg daily.    Problem Noted   Gastroesophageal Reflux Disease 8/2/2022   Hypertension 8/2/2022   Mitral Valve Prolapse 8/2/2022   Hyperlipidemia 8/2/2022   Age-Related Cataract (Resolved) 8/2/2022        The patient's Health Maintenance was reviewed and the following appears to be due:   Health Maintenance Due   Topic Date Due    TETANUS VACCINE  Never done    Shingles Vaccine (1 of 2) Never done    RSV Vaccine (Age 60+) (1 - 1-dose 60+ series) Never done    COVID-19 Vaccine (3 - 2023-24 season) 09/01/2023       Past Medical History:  Past Medical History:   Diagnosis Date    Age-related cataract 08/02/2022    GERD (gastroesophageal reflux disease)     HTN (hypertension)     Mitral valve prolapse      Review of patient's allergies indicates:   Allergen Reactions    Codeine      Other reaction(s): Dizzy     Current Outpatient Medications on File Prior to Visit   Medication Sig Dispense Refill    celecoxib (CELEBREX) 100 MG capsule Take 1 capsule (100 mg total) by mouth 2 (two) times daily. 30 capsule 0    [DISCONTINUED] amLODIPine (NORVASC) 5 MG tablet Take 1 tablet (5 mg total) by mouth once daily. 30 tablet 11    [DISCONTINUED] hydroCHLOROthiazide (HYDRODIURIL) 25 MG tablet Take 1 tablet (25 mg total) by mouth once daily. 30 tablet 6    [DISCONTINUED] valsartan (DIOVAN) 320 MG tablet Take 1 tablet (320 mg total) by mouth once daily. 90 tablet 3     No current facility-administered medications on file prior to visit.       Review of Systems    Objective:   BP (!) 198/80 (BP Location: Right arm, Patient Position: Sitting,  "BP Method: Small (Manual))   Pulse 67   Resp 18   Ht 5' 2.01" (1.575 m)   Wt 66.2 kg (146 lb)   SpO2 96%   BMI 26.70 kg/m²     Physical Exam  Vitals reviewed.   Constitutional:       General: She is not in acute distress.     Appearance: Normal appearance. She is not ill-appearing or diaphoretic.   HENT:      Head: Normocephalic and atraumatic.   Pulmonary:      Effort: Pulmonary effort is normal.   Skin:     General: Skin is warm and dry.   Neurological:      General: No focal deficit present.      Mental Status: She is alert.   Psychiatric:         Mood and Affect: Mood normal.         Behavior: Behavior normal.         Thought Content: Thought content normal.         Judgment: Judgment normal.       Assessment and Plan:     Hypertension  IN further discussion with Ms. Arredondo and confirmation with the pharmacist, she was only taking valsartan 160 mg daily.  She reported being afraid to take "all those meds".  Will change her to diovan hct 320/25 mg daily and have her f/u in 1 mo.  She does also have the clonidine for prn use.      Follow up in about 4 weeks (around 11/28/2023).    Medications Ordered This Encounter   Medications    valsartan-hydrochlorothiazide (DIOVAN-HCT) 320-25 mg per tablet     Sig: Take 1 tablet by mouth once daily.     Dispense:  90 tablet     Refill:  3     .     [unfilled]  Orders Placed This Encounter   Procedures    Influenza (FLUAD) - Quadrivalent (Adjuvanted) *Preferred* (65+) (PF)       "

## 2023-11-25 ENCOUNTER — OFFICE VISIT (OUTPATIENT)
Dept: URGENT CARE | Facility: CLINIC | Age: 88
End: 2023-11-25
Payer: MEDICARE

## 2023-11-25 VITALS
TEMPERATURE: 98 F | OXYGEN SATURATION: 99 % | SYSTOLIC BLOOD PRESSURE: 172 MMHG | BODY MASS INDEX: 26.87 KG/M2 | HEART RATE: 69 BPM | RESPIRATION RATE: 16 BRPM | WEIGHT: 146 LBS | HEIGHT: 62 IN | DIASTOLIC BLOOD PRESSURE: 74 MMHG

## 2023-11-25 DIAGNOSIS — H00.014 HORDEOLUM EXTERNUM OF LEFT UPPER EYELID: Primary | ICD-10-CM

## 2023-11-25 PROCEDURE — 99213 PR OFFICE/OUTPT VISIT, EST, LEVL III, 20-29 MIN: ICD-10-PCS | Mod: ,,, | Performed by: FAMILY MEDICINE

## 2023-11-25 PROCEDURE — 99213 OFFICE O/P EST LOW 20 MIN: CPT | Mod: ,,, | Performed by: FAMILY MEDICINE

## 2023-11-25 RX ORDER — ERYTHROMYCIN 5 MG/G
OINTMENT OPHTHALMIC EVERY 8 HOURS
Qty: 3.5 G | Refills: 0 | Status: SHIPPED | OUTPATIENT
Start: 2023-11-25 | End: 2023-11-25 | Stop reason: SDUPTHER

## 2023-11-25 RX ORDER — ERYTHROMYCIN 5 MG/G
OINTMENT OPHTHALMIC EVERY 8 HOURS
Qty: 3.5 G | Refills: 0 | Status: SHIPPED | OUTPATIENT
Start: 2023-11-25 | End: 2023-12-02

## 2023-11-25 NOTE — PATIENT INSTRUCTIONS
Discussed the physical finding, condition and course.  Monitor the symptoms.  Medications as directed.  Topical warm compresses not to burn the skin.  Call or return to clinic for any questions.

## 2023-11-25 NOTE — PROGRESS NOTES
"Subjective:      Patient ID: Kiarra Arredondo is a 88 y.o. female.    Vitals:  height is 5' 2" (1.575 m) and weight is 66.2 kg (146 lb). Her temperature is 98 °F (36.7 °C). Her blood pressure is 172/74 (abnormal) and her pulse is 69. Her respiration is 16 and oxygen saturation is 99%.     Chief Complaint: Eye Problem (X 2 days: eye redness, itching, swelling, pain - left eye )    HPI:  88-year-old female present to clinic with concerns of left eye redness itching and swelling associated with pain since 2 days.  No change in vision.  No drainage.  Does not wear contact lens    ROS :  Constitutional : No fever, no fatigue  Eyes : As per HPI  Neck : Negative except HPI  HENT :  No sore throat, no difficulty swallowing  Respiratory : No shortness of breath, no wheezing  Cardiovascular : No chest pain  Musculoskeletal : Negative except HPI  Integumentary : No rash  Neurological : Negative for tingling numbness and weakness    Objective:     Physical Exam  General : Alert and Oriented, No apparent distress, afebrile  Eyes :  Bilateral sclera and conjunctiva no redness or drainage.  Left upper eyelid nasally 2 visible external stye, red base, tender to palpate  Neck - supple  HENT : Oropharynx no redness or swelling.   Respiratory : Bilateral equal breath sounds, nonlabored respirations  Cardiovascular : Rate, rhythm regular, normal volume pulse, no murmur  Integumentary : Warm, Dry    Assessment:     1. Hordeolum externum of left upper eyelid      Plan:   Discussed the physical finding, condition and course.  Monitor the symptoms.  Medications as directed.  Topical warm compresses not to burn the skin.  Call or return to clinic for any questions.    Hordeolum externum of left upper eyelid  -     erythromycin (ROMYCIN) ophthalmic ointment; Place into the left eye every 8 (eight) hours. for 7 days  Dispense: 3.5 g; Refill: 0                    "

## 2023-11-27 ENCOUNTER — TELEPHONE (OUTPATIENT)
Dept: INTERNAL MEDICINE | Facility: CLINIC | Age: 88
End: 2023-11-27
Payer: MEDICARE

## 2023-11-27 NOTE — TELEPHONE ENCOUNTER
----- Message from Mindy Adan LPN sent at 11/27/2023  9:01 AM CST -----  Regarding: BALDEMAR Rodriguez 12/4/23 @11:20a  Are there any outstanding tasks in the chart? no    Is there any documentation of tasks? no    Has patient seen another physician, been to the ER, C, or admitted to hospital since last visit?    Has the patient done blood work or imaging since last visit?

## 2023-12-04 ENCOUNTER — OFFICE VISIT (OUTPATIENT)
Dept: INTERNAL MEDICINE | Facility: CLINIC | Age: 88
End: 2023-12-04
Payer: MEDICARE

## 2023-12-04 VITALS
HEART RATE: 66 BPM | SYSTOLIC BLOOD PRESSURE: 170 MMHG | DIASTOLIC BLOOD PRESSURE: 68 MMHG | WEIGHT: 144 LBS | HEIGHT: 62 IN | OXYGEN SATURATION: 95 % | BODY MASS INDEX: 26.5 KG/M2 | RESPIRATION RATE: 18 BRPM

## 2023-12-04 DIAGNOSIS — I10 PRIMARY HYPERTENSION: Primary | ICD-10-CM

## 2023-12-04 PROCEDURE — 99213 PR OFFICE/OUTPT VISIT, EST, LEVL III, 20-29 MIN: ICD-10-PCS | Mod: ,,, | Performed by: INTERNAL MEDICINE

## 2023-12-04 PROCEDURE — 99213 OFFICE O/P EST LOW 20 MIN: CPT | Mod: ,,, | Performed by: INTERNAL MEDICINE

## 2023-12-04 RX ORDER — AMLODIPINE BESYLATE 5 MG/1
5 TABLET ORAL DAILY
Qty: 90 TABLET | Refills: 3 | Status: SHIPPED | OUTPATIENT
Start: 2023-12-04 | End: 2024-12-03

## 2023-12-04 NOTE — PROGRESS NOTES
"Subjective:      Chief Complaint: Follow-up (4wk )      HPI:She is ehre for f/u htn.  She reports compliance with her meds.  She is monitoring her BP at home.  She brought a log of her pressures.  It was 139/71 this am.  163/72.  She likes the Valsartan.  Problem Noted   Gastroesophageal Reflux Disease 8/2/2022   Hypertension 8/2/2022   Mitral Valve Prolapse 8/2/2022   Hyperlipidemia 8/2/2022   Age-Related Cataract (Resolved) 8/2/2022        The patient's Health Maintenance was reviewed and the following appears to be due:   Health Maintenance Due   Topic Date Due    TETANUS VACCINE  Never done    Shingles Vaccine (1 of 2) Never done    RSV Vaccine (Age 60+ and Pregnant patients) (1 - 1-dose 60+ series) Never done    COVID-19 Vaccine (3 - 2023-24 season) 09/01/2023       Past Medical History:  Past Medical History:   Diagnosis Date    Age-related cataract 08/02/2022    GERD (gastroesophageal reflux disease)     HTN (hypertension)     Mitral valve prolapse      Review of patient's allergies indicates:   Allergen Reactions    Codeine      Other reaction(s): Dizzy     Current Outpatient Medications on File Prior to Visit   Medication Sig Dispense Refill    celecoxib (CELEBREX) 100 MG capsule Take 1 capsule (100 mg total) by mouth 2 (two) times daily. 30 capsule 0    valsartan-hydrochlorothiazide (DIOVAN-HCT) 320-25 mg per tablet Take 1 tablet by mouth once daily. 90 tablet 3     No current facility-administered medications on file prior to visit.       Review of Systems    Objective:   BP (!) 170/68 (BP Location: Right arm, Patient Position: Sitting, BP Method: Small (Manual))   Pulse 66   Resp 18   Ht 5' 2.01" (1.575 m)   Wt 65.3 kg (144 lb)   SpO2 95%   BMI 26.33 kg/m²     Physical Exam  Vitals reviewed.   Constitutional:       General: She is not in acute distress.     Appearance: Normal appearance. She is not ill-appearing or diaphoretic.   HENT:      Head: Normocephalic and atraumatic.   Pulmonary:      " Effort: Pulmonary effort is normal.   Skin:     General: Skin is warm and dry.   Neurological:      General: No focal deficit present.      Mental Status: She is alert.   Psychiatric:         Mood and Affect: Mood normal.         Behavior: Behavior normal.         Thought Content: Thought content normal.         Judgment: Judgment normal.       Assessment and Plan:     Hypertension  Not at goal.  Add amlodipine.  Cont. Valsartan hct and recheck in a month.  I offered the digital medicine program.  But she declined and will purchase a new cuff on her own.      Follow up in about 4 weeks (around 1/1/2024).    Medications Ordered This Encounter   Medications    amLODIPine (NORVASC) 5 MG tablet     Sig: Take 1 tablet (5 mg total) by mouth once daily.     Dispense:  90 tablet     Refill:  3     .     [unfilled]  No orders of the defined types were placed in this encounter.

## 2023-12-04 NOTE — ASSESSMENT & PLAN NOTE
Not at goal.  Add amlodipine.  Cont. Valsartan hct and recheck in a month.  I offered the digital medicine program.  But she declined and will purchase a new cuff on her own.

## 2024-01-02 ENCOUNTER — OFFICE VISIT (OUTPATIENT)
Dept: URGENT CARE | Facility: CLINIC | Age: 89
End: 2024-01-02
Payer: MEDICARE

## 2024-01-02 VITALS
HEART RATE: 79 BPM | WEIGHT: 140 LBS | BODY MASS INDEX: 25.76 KG/M2 | HEIGHT: 62 IN | OXYGEN SATURATION: 97 % | DIASTOLIC BLOOD PRESSURE: 73 MMHG | SYSTOLIC BLOOD PRESSURE: 163 MMHG | RESPIRATION RATE: 18 BRPM | TEMPERATURE: 99 F

## 2024-01-02 DIAGNOSIS — R05.9 COUGH, UNSPECIFIED TYPE: Primary | ICD-10-CM

## 2024-01-02 LAB
CTP QC/QA: YES
POC MOLECULAR INFLUENZA A AGN: NEGATIVE
POC MOLECULAR INFLUENZA B AGN: NEGATIVE

## 2024-01-02 PROCEDURE — 99213 OFFICE O/P EST LOW 20 MIN: CPT | Mod: ,,, | Performed by: FAMILY MEDICINE

## 2024-01-02 PROCEDURE — 87502 INFLUENZA DNA AMP PROBE: CPT | Mod: QW,,, | Performed by: FAMILY MEDICINE

## 2024-01-02 RX ORDER — DOXYCYCLINE 100 MG/1
100 CAPSULE ORAL EVERY 12 HOURS
Qty: 10 CAPSULE | Refills: 0 | Status: SHIPPED | OUTPATIENT
Start: 2024-01-02 | End: 2024-01-08

## 2024-01-02 NOTE — PATIENT INSTRUCTIONS
Discussed the physical finding, concerns of allergies or viral etiology.  Monitor the symptoms closely.  Coricidin HBP for cough and cold.  Adequate hydration and rest.  For worsening symptoms and signs of infection after 3-4 days start antibiotics as needed.  Take the medications with food and milk to avoid gastric symptoms and probiotics as needed  Call or return to clinic for any questions  Flu test negative

## 2024-01-02 NOTE — PROGRESS NOTES
"Subjective:      Patient ID: Kiarra Arredondo is a 88 y.o. female.    Vitals:  height is 5' 2" (1.575 m) and weight is 63.5 kg (140 lb). Her temperature is 98.7 °F (37.1 °C). Her blood pressure is 163/73 (abnormal) and her pulse is 79. Her respiration is 18 and oxygen saturation is 97%.     Chief Complaint: Cough (Patient presents to the clinic today with complaints of productive cough, chills, nausea, body aches since X 3 days. )    HPI:  88-year-old female with known history of hypertension present to clinic with concerns of coughing and chest congestion since 3 days.  Felt chills, no fever.  No concerns of positive exposure to infections.  Defers testing for COVID-19.  States vaccinated for COVID-19, no positive exposure    ROS :  Constitutional : _ no measured fever, Body aches, Chills  Eyes : _No redness, drainage or pain  HENT_No sore throat, postnasal drainage  Respiratory_no wheezing, no shortness of breath  Cardiovascular_no chest pain  Gastrointestinal_ No vomiting, No diarrhea, No abdominal pain  Musculoskeletal_no joint pain, no joint swelling  Integumentary_no skin rash     Objective:     Physical Exam  General : Alert and Oriented, No apparent distress, afebrile, appears comfortable sitting on exam table, clear speech and appropriate communication  Neck - supple  HENT : Oropharynx no redness or swelling.    Respiratory : Bilateral equal breath sounds, nonlabored respirations, no wheezing, no bronchial breath sounds  Cardiovascular : Rate, rhythm regular, normal volume pulse, no murmur  Gastrointestinal: Full abdomen, soft, nontender to palpate  Integumentary : Warm, Dry and no rash    Assessment:     1. Cough, unspecified type      Plan:   Discussed the physical finding, concerns of allergies or viral etiology.  Monitor the symptoms closely.  Coricidin HBP for cough and cold.  Adequate hydration and rest.  For worsening symptoms and signs of infection after 3-4 days start antibiotics as needed.  Take " the medications with food and milk to avoid gastric symptoms and probiotics as needed  Call or return to clinic for any questions  Flu test negative    Cough, unspecified type  -     POCT Influenza A/B Molecular  -     doxycycline (MONODOX) 100 MG capsule; Take 1 capsule (100 mg total) by mouth every 12 (twelve) hours. for 5 days  Dispense: 10 capsule; Refill: 0

## 2024-01-03 DIAGNOSIS — I10 PRIMARY HYPERTENSION: Primary | ICD-10-CM

## 2024-01-05 ENCOUNTER — TELEPHONE (OUTPATIENT)
Dept: INTERNAL MEDICINE | Facility: CLINIC | Age: 89
End: 2024-01-05
Payer: MEDICARE

## 2024-01-05 NOTE — TELEPHONE ENCOUNTER
"----- Message from El Stovall MA sent at 1/3/2024 11:51 AM CST -----  Regarding: Myah 01/08 @ 11:00AM  Are there any outstanding tasks in chart? No, but needs FASTING labs, TO BE DONE AT  "[facility]" or lab location of choice PRIOR to appt    Is there any documentation of tasks? no    Has the pt seen another physician, been to ER, UCC, or admitted to hospital since last visit?    Has the pt done blood work or imaging since last visit?    5. PLEASE HAVE PATIENT BRING MEDICATION LIST OR BOTTLES TO EVERY OFFICE VISIT     "

## 2024-01-08 ENCOUNTER — OFFICE VISIT (OUTPATIENT)
Dept: INTERNAL MEDICINE | Facility: CLINIC | Age: 89
End: 2024-01-08
Payer: MEDICARE

## 2024-01-08 VITALS
SYSTOLIC BLOOD PRESSURE: 138 MMHG | WEIGHT: 144.38 LBS | OXYGEN SATURATION: 98 % | BODY MASS INDEX: 26.57 KG/M2 | HEIGHT: 62 IN | HEART RATE: 77 BPM | TEMPERATURE: 98 F | RESPIRATION RATE: 16 BRPM | DIASTOLIC BLOOD PRESSURE: 75 MMHG

## 2024-01-08 DIAGNOSIS — Z00.00 ENCOUNTER FOR MEDICARE ANNUAL WELLNESS EXAM: ICD-10-CM

## 2024-01-08 DIAGNOSIS — I10 PRIMARY HYPERTENSION: Primary | ICD-10-CM

## 2024-01-08 PROCEDURE — 99213 OFFICE O/P EST LOW 20 MIN: CPT | Mod: ,,, | Performed by: NURSE PRACTITIONER

## 2024-01-08 NOTE — PROGRESS NOTES
"Subjective:      Patient ID: Kiarra Arredondo is a 88 y.o. female.    Chief Complaint: Hypertension (4 week f/u )      HPI: Patient here today for BP recheck. At LOV with Dr. Rodriguez, amlodipine 5mg added to current regimen of valsartan and HCTZ. Denies issues or intolerances with current dosages of medications. No CP, palpitations, or SOB. States that stresses have reduced. Continued issues with R knee pain for which she has received injections per Dr. Garza.  She never too Celebrex.      Review of patient's allergies indicates:   Allergen Reactions    Codeine      Other reaction(s): Dizzy       Review of Systems  Constitutional: No fatigue, No weight loss.  Eyes: No blurring.  Respiratory: No shortness of breath, No exertional dyspnea.   Cardiovascular: No chest pain, No peripheral edema.  MS: R knee pain  Neurologic: No altered mental status, No headaches.  Psychiatrics: No anxiety,No depression, No SI/HI.  Objective:   Visit Vitals  /75 (BP Location: Right arm, Patient Position: Sitting, BP Method: Medium (Manual))   Pulse 77   Temp 98.3 °F (36.8 °C)   Resp 16   Ht 5' 2" (1.575 m)   Wt 65.5 kg (144 lb 6.4 oz)   SpO2 98%   BMI 26.41 kg/m²     The patient's weight trend is below:   Wt Readings from Last 4 Encounters:   01/08/24 65.5 kg (144 lb 6.4 oz)   01/02/24 63.5 kg (140 lb)   12/04/23 65.3 kg (144 lb)   11/25/23 66.2 kg (146 lb)        Physical Exam  Vitals and nursing note reviewed.   Constitutional:       General: She is not in acute distress.     Appearance: Normal appearance. She is normal weight. She is not ill-appearing, toxic-appearing or diaphoretic.   HENT:      Head: Normocephalic and atraumatic.   Cardiovascular:      Rate and Rhythm: Normal rate and regular rhythm.      Heart sounds: Normal heart sounds.   Pulmonary:      Effort: Pulmonary effort is normal.   Neurological:      General: No focal deficit present.      Mental Status: She is alert and oriented to person, place, and time. " "Mental status is at baseline.         Assessment/Plan:   1. Primary hypertension  HYPERTENSION RECOMMENDATIONS:  Continue current treatment regimen.  Dietary sodium restriction.  Regular aerobic exercise.  Weight loss.  Reduce stress.  Goal BP <140/90s    - CBC Auto Differential; Future  - Comprehensive Metabolic Panel; Future  - Lipid Panel; Future      Medication List with Changes/Refills   Current Medications    AMLODIPINE (NORVASC) 5 MG TABLET    Take 1 tablet (5 mg total) by mouth once daily.    VALSARTAN-HYDROCHLOROTHIAZIDE (DIOVAN-HCT) 320-25 MG PER TABLET    Take 1 tablet by mouth once daily.   Discontinued Medications    CELECOXIB (CELEBREX) 100 MG CAPSULE    Take 1 capsule (100 mg total) by mouth 2 (two) times daily.    DOXYCYCLINE (MONODOX) 100 MG CAPSULE    Take 1 capsule (100 mg total) by mouth every 12 (twelve) hours. for 5 days        Follow up in about 6 months (around 7/8/2024) for With Dr. Rodriguez.    Chemistry:  Lab Results   Component Value Date     05/03/2023    K 5.1 05/03/2023    CHLORIDE 102 05/03/2023    BUN 21.3 (H) 05/03/2023    CREATININE 1.09 (H) 05/03/2023    EGFRNORACEVR 49 05/03/2023    GLUCOSE 102 05/03/2023    CALCIUM 10.1 05/03/2023    ALKPHOS 69 07/28/2022    LABPROT 6.8 07/28/2022    ALBUMIN 4.0 07/28/2022    BILIDIR 0.3 12/02/2021    IBILI 0.70 12/02/2021    AST 20 07/28/2022    ALT 15 07/28/2022    OLOCIWKP45QH 67.8 08/09/2021        No results found for: "HGBA1C", "MICROALBCREA"     Hematology:  Lab Results   Component Value Date    WBC 4.8 07/28/2022    HGB 14.1 07/28/2022    HCT 43.2 07/28/2022     07/28/2022       Lipid Panel:  Lab Results   Component Value Date    CHOL 231 (H) 07/28/2022    HDL 75 (H) 07/28/2022    .00 07/28/2022    TRIG 91 07/28/2022    TOTALCHOLEST 3 07/28/2022        Urine:  Lab Results   Component Value Date    APPEARANCEUA CLEAR 12/02/2021    PROTEINUA Negative 12/02/2021    LEUKOCYTESUR Negative 12/02/2021    RBCUA NONE SEEN " 12/02/2021    WBCUA NONE SEEN 12/02/2021    BACTERIA NONE SEEN 12/02/2021

## 2024-03-11 ENCOUNTER — TELEPHONE (OUTPATIENT)
Dept: INTERNAL MEDICINE | Facility: CLINIC | Age: 89
End: 2024-03-11
Payer: MEDICARE

## 2024-03-11 ENCOUNTER — LAB VISIT (OUTPATIENT)
Dept: LAB | Facility: HOSPITAL | Age: 89
End: 2024-03-11
Attending: INTERNAL MEDICINE
Payer: MEDICARE

## 2024-03-11 DIAGNOSIS — R30.0 DYSURIA: ICD-10-CM

## 2024-03-11 DIAGNOSIS — R30.0 DYSURIA: Primary | ICD-10-CM

## 2024-03-11 LAB
APPEARANCE UR: ABNORMAL
BACTERIA #/AREA URNS AUTO: ABNORMAL /HPF
BILIRUB UR QL STRIP.AUTO: NEGATIVE
COLOR UR AUTO: ABNORMAL
GLUCOSE UR QL STRIP.AUTO: NORMAL
KETONES UR QL STRIP.AUTO: NEGATIVE
LEUKOCYTE ESTERASE UR QL STRIP.AUTO: 75
NITRITE UR QL STRIP.AUTO: NEGATIVE
PH UR STRIP.AUTO: 5.5 [PH]
PROT UR QL STRIP.AUTO: ABNORMAL
RBC #/AREA URNS AUTO: >100 /HPF
RBC UR QL AUTO: ABNORMAL
SP GR UR STRIP.AUTO: 1.01 (ref 1–1.03)
SQUAMOUS #/AREA URNS LPF: ABNORMAL /HPF
UROBILINOGEN UR STRIP-ACNC: NORMAL
WBC #/AREA URNS AUTO: ABNORMAL /HPF

## 2024-03-11 PROCEDURE — 81001 URINALYSIS AUTO W/SCOPE: CPT

## 2024-03-11 PROCEDURE — 87086 URINE CULTURE/COLONY COUNT: CPT

## 2024-03-11 RX ORDER — PHENAZOPYRIDINE HYDROCHLORIDE 200 MG/1
200 TABLET, FILM COATED ORAL 3 TIMES DAILY PRN
Qty: 6 TABLET | Refills: 0 | Status: SHIPPED | OUTPATIENT
Start: 2024-03-11 | End: 2024-03-13

## 2024-03-11 RX ORDER — SULFAMETHOXAZOLE AND TRIMETHOPRIM 800; 160 MG/1; MG/1
1 TABLET ORAL 2 TIMES DAILY
Qty: 6 TABLET | Refills: 0 | Status: SHIPPED | OUTPATIENT
Start: 2024-03-11

## 2024-03-11 NOTE — TELEPHONE ENCOUNTER
She has a UTI.  I sent a rx for bactrim for her to start today.  Tell her to call if not feeling better over the next couple of days.  I also sent a rx for pyridium to help with the discomfort.Warn her it will turn her urine orange.

## 2024-03-11 NOTE — TELEPHONE ENCOUNTER
I spoke with pt. She stated this started lastnight, burning when she urinates, blood in urine. I told her to go to [facility] to leave urine specimen. UA ordered. She verbalized understanding. She will go now.

## 2024-03-11 NOTE — TELEPHONE ENCOUNTER
----- Message from Zoiecosta Kate sent at 3/11/2024 10:16 AM CDT -----  Regarding: advice  Type:  Needs Medical Advice    Who Called: pt  Symptoms (please be specific): urgency to urinate, burning, bleeding, possible uti  How long has patient had these symptoms:  last night    Best Call Back Number: [phone]  Additional Information: needing to speak to the nurse , stated she has been bleeding everytime she urinates. Stated that she has been up every hour since 12 last night. Please advice

## 2024-03-13 LAB — BACTERIA UR CULT: NORMAL

## 2024-07-03 ENCOUNTER — TELEPHONE (OUTPATIENT)
Dept: INTERNAL MEDICINE | Facility: CLINIC | Age: 89
End: 2024-07-03
Payer: MEDICARE

## 2024-07-03 NOTE — TELEPHONE ENCOUNTER
----- Message from Mindy Adan LPN sent at 7/3/2024  7:48 AM CDT -----  Regarding: BALDEMAR Rodriguez 7/11/24 @11:20a  Are there any outstanding tasks in the chart? Pt will need fasting labs    Is there any documentation of tasks? no    Please tell patient to bring living will, power of , or advance directive document to visit if they have it.

## 2024-07-08 ENCOUNTER — LAB VISIT (OUTPATIENT)
Dept: LAB | Facility: HOSPITAL | Age: 89
End: 2024-07-08
Attending: NURSE PRACTITIONER
Payer: MEDICARE

## 2024-07-08 DIAGNOSIS — I10 PRIMARY HYPERTENSION: ICD-10-CM

## 2024-07-08 DIAGNOSIS — Z00.00 ENCOUNTER FOR MEDICARE ANNUAL WELLNESS EXAM: ICD-10-CM

## 2024-07-08 LAB
ALBUMIN SERPL-MCNC: 4.4 G/DL (ref 3.4–4.8)
ALBUMIN/GLOB SERPL: 1.5 RATIO (ref 1.1–2)
ALP SERPL-CCNC: 74 UNIT/L (ref 40–150)
ALT SERPL-CCNC: 19 UNIT/L (ref 0–55)
ANION GAP SERPL CALC-SCNC: 9 MEQ/L
AST SERPL-CCNC: 18 UNIT/L (ref 5–34)
BASOPHILS # BLD AUTO: 0.03 X10(3)/MCL
BASOPHILS NFR BLD AUTO: 0.4 %
BILIRUB SERPL-MCNC: 0.8 MG/DL
BUN SERPL-MCNC: 33.5 MG/DL (ref 9.8–20.1)
CALCIUM SERPL-MCNC: 10.2 MG/DL (ref 8.4–10.2)
CHLORIDE SERPL-SCNC: 99 MMOL/L (ref 98–107)
CHOLEST SERPL-MCNC: 245 MG/DL
CHOLEST/HDLC SERPL: 3 {RATIO} (ref 0–5)
CO2 SERPL-SCNC: 25 MMOL/L (ref 23–31)
CREAT SERPL-MCNC: 1.32 MG/DL (ref 0.55–1.02)
CREAT/UREA NIT SERPL: 25
EOSINOPHIL # BLD AUTO: 0.04 X10(3)/MCL (ref 0–0.9)
EOSINOPHIL NFR BLD AUTO: 0.6 %
ERYTHROCYTE [DISTWIDTH] IN BLOOD BY AUTOMATED COUNT: 13.1 % (ref 11.5–17)
GFR SERPLBLD CREATININE-BSD FMLA CKD-EPI: 39 ML/MIN/1.73/M2
GLOBULIN SER-MCNC: 2.9 GM/DL (ref 2.4–3.5)
GLUCOSE SERPL-MCNC: 99 MG/DL (ref 82–115)
HCT VFR BLD AUTO: 42 % (ref 37–47)
HDLC SERPL-MCNC: 82 MG/DL (ref 35–60)
HGB BLD-MCNC: 13.9 G/DL (ref 12–16)
IMM GRANULOCYTES # BLD AUTO: 0.02 X10(3)/MCL (ref 0–0.04)
IMM GRANULOCYTES NFR BLD AUTO: 0.3 %
LDLC SERPL CALC-MCNC: 141 MG/DL (ref 50–140)
LYMPHOCYTES # BLD AUTO: 1.98 X10(3)/MCL (ref 0.6–4.6)
LYMPHOCYTES NFR BLD AUTO: 29.1 %
MCH RBC QN AUTO: 29.5 PG (ref 27–31)
MCHC RBC AUTO-ENTMCNC: 33.1 G/DL (ref 33–36)
MCV RBC AUTO: 89.2 FL (ref 80–94)
MONOCYTES # BLD AUTO: 0.52 X10(3)/MCL (ref 0.1–1.3)
MONOCYTES NFR BLD AUTO: 7.6 %
NEUTROPHILS # BLD AUTO: 4.22 X10(3)/MCL (ref 2.1–9.2)
NEUTROPHILS NFR BLD AUTO: 62 %
NRBC BLD AUTO-RTO: 0 %
PLATELET # BLD AUTO: 217 X10(3)/MCL (ref 130–400)
PMV BLD AUTO: 10.4 FL (ref 7.4–10.4)
POTASSIUM SERPL-SCNC: 5.2 MMOL/L (ref 3.5–5.1)
PROT SERPL-MCNC: 7.3 GM/DL (ref 5.8–7.6)
RBC # BLD AUTO: 4.71 X10(6)/MCL (ref 4.2–5.4)
SODIUM SERPL-SCNC: 133 MMOL/L (ref 136–145)
TRIGL SERPL-MCNC: 109 MG/DL (ref 37–140)
VLDLC SERPL CALC-MCNC: 22 MG/DL
WBC # BLD AUTO: 6.81 X10(3)/MCL (ref 4.5–11.5)

## 2024-07-08 PROCEDURE — 36415 COLL VENOUS BLD VENIPUNCTURE: CPT

## 2024-07-08 PROCEDURE — 80053 COMPREHEN METABOLIC PANEL: CPT

## 2024-07-08 PROCEDURE — 80061 LIPID PANEL: CPT

## 2024-07-08 PROCEDURE — 85025 COMPLETE CBC W/AUTO DIFF WBC: CPT

## 2024-07-15 PROBLEM — N18.32 STAGE 3B CHRONIC KIDNEY DISEASE: Status: ACTIVE | Noted: 2024-07-15

## 2024-07-16 ENCOUNTER — OFFICE VISIT (OUTPATIENT)
Dept: INTERNAL MEDICINE | Facility: CLINIC | Age: 89
End: 2024-07-16
Payer: MEDICARE

## 2024-07-16 VITALS
RESPIRATION RATE: 18 BRPM | OXYGEN SATURATION: 100 % | BODY MASS INDEX: 26.13 KG/M2 | DIASTOLIC BLOOD PRESSURE: 62 MMHG | HEART RATE: 52 BPM | HEIGHT: 62 IN | SYSTOLIC BLOOD PRESSURE: 132 MMHG | WEIGHT: 142 LBS

## 2024-07-16 DIAGNOSIS — E78.5 HYPERLIPIDEMIA, UNSPECIFIED HYPERLIPIDEMIA TYPE: ICD-10-CM

## 2024-07-16 DIAGNOSIS — N18.32 STAGE 3B CHRONIC KIDNEY DISEASE: ICD-10-CM

## 2024-07-16 DIAGNOSIS — Z00.00 ENCOUNTER FOR MEDICARE ANNUAL WELLNESS EXAM: Primary | ICD-10-CM

## 2024-07-16 DIAGNOSIS — I10 PRIMARY HYPERTENSION: ICD-10-CM

## 2024-07-16 DIAGNOSIS — K21.9 GASTROESOPHAGEAL REFLUX DISEASE, UNSPECIFIED WHETHER ESOPHAGITIS PRESENT: ICD-10-CM

## 2024-07-16 PROCEDURE — G0439 PPPS, SUBSEQ VISIT: HCPCS | Mod: ,,, | Performed by: INTERNAL MEDICINE

## 2024-07-16 RX ORDER — ROSUVASTATIN CALCIUM 5 MG/1
5 TABLET, COATED ORAL NIGHTLY
Qty: 90 TABLET | Refills: 3 | Status: SHIPPED | OUTPATIENT
Start: 2024-07-16 | End: 2025-07-16

## 2024-07-16 NOTE — PROGRESS NOTES
Patient informed of message below, verbalized understanding.  Called Count includes the [facility] to update.  They do not have updated metoprolol dose, sent as requested.   Subjective:        Patient Care Team:  Trista Rodriguez MD as PCP - General (Internal Medicine)  Prosper Olson MD Ingraldi, Agostino, MD as Consulting Physician (Cardiovascular Disease)     Chief Complaint: Medicare AWV (Discuss labs /C/o reflux )      HPI:She is here for a medicare wellness.  She c/o not sleeping well and also having some acid reflux.  She used to take prilosec and she takes tums.  She does get some reflux and epigastric pain along with heartburn.  She has a cycle stand for her feet.  She has a bad right knee and Dr. Garza wants her to do a knee replacement.  She is afraid to do surgery.  She gets in bed to go to sleep at 9 pm.  Occ she falls right to sleep.  Then she will awaken at 11 pm, fall asleep again and wakes back up at 2 am.  She drinks 1 cup of coffee per day.    Her BP has been doing well at home.  She does check it.  She tries to drink a lot of water during the day.  Problem Noted   Encounter for Medicare Annual Wellness Exam 7/16/2024   Stage 3b Chronic Kidney Disease 7/15/2024   Gastroesophageal Reflux Disease 8/2/2022   Hypertension 8/2/2022   Mitral Valve Prolapse 8/2/2022   Hyperlipidemia 8/2/2022   Age-Related Cataract (Resolved) 8/2/2022        The patient's Health Maintenance was reviewed and the following appears to be due:   Health Maintenance Due   Topic Date Due    TETANUS VACCINE  Never done    Shingles Vaccine (1 of 2) Never done    RSV Vaccine (Age 60+ and Pregnant patients) (1 - 1-dose 60+ series) Never done    COVID-19 Vaccine (3 - 2023-24 season) 09/01/2023       Problem List  Active Problem List with Overview Notes    Diagnosis Date Noted    Encounter for Medicare annual wellness exam 07/16/2024    Stage 3b chronic kidney disease 07/15/2024    Gastroesophageal reflux disease 08/02/2022    Hypertension 08/02/2022    Mitral valve prolapse 08/02/2022    Hyperlipidemia 08/02/2022       Past Medical History:  Past Medical History:   Diagnosis Date     Age-related cataract 08/02/2022    Arthritis     GERD (gastroesophageal reflux disease)     HTN (hypertension)     Mitral valve prolapse      Past Surgical History:   Procedure Laterality Date    CATARACT EXTRACTION      COLONOSCOPY      EYE SURGERY  04/2022    SINUS SURGERY      TOTAL ABDOMINAL HYSTERECTOMY W/ BILATERAL SALPINGOOPHORECTOMY       Review of patient's allergies indicates:   Allergen Reactions    Codeine      Other reaction(s): Dizzy     Current Outpatient Medications on File Prior to Visit   Medication Sig Dispense Refill    amLODIPine (NORVASC) 5 MG tablet Take 1 tablet (5 mg total) by mouth once daily. 90 tablet 3    valsartan-hydrochlorothiazide (DIOVAN-HCT) 320-25 mg per tablet Take 1 tablet by mouth once daily. 90 tablet 3    [DISCONTINUED] sulfamethoxazole-trimethoprim 800-160mg (BACTRIM DS) 800-160 mg Tab Take 1 tablet by mouth 2 (two) times daily. 6 tablet 0     No current facility-administered medications on file prior to visit.     Social History     Socioeconomic History    Marital status:    Tobacco Use    Smoking status: Never     Passive exposure: Never    Smokeless tobacco: Never   Substance and Sexual Activity    Alcohol use: Yes     Alcohol/week: 4.0 standard drinks of alcohol     Types: 4 Glasses of wine per week    Drug use: Never    Sexual activity: Never   Social History Narrative    Pt lives in her own home by herself and has family nearby     Social Determinants of Health     Financial Resource Strain: Low Risk  (1/5/2024)    Overall Financial Resource Strain (CARDIA)     Difficulty of Paying Living Expenses: Not hard at all   Food Insecurity: No Food Insecurity (1/5/2024)    Hunger Vital Sign     Worried About Running Out of Food in the Last Year: Never true     Ran Out of Food in the Last Year: Never true   Transportation Needs: No Transportation Needs (1/5/2024)    PRAPARE - Transportation     Lack of Transportation (Medical): No     Lack of Transportation  "(Non-Medical): No   Physical Activity: Unknown (1/5/2024)    Exercise Vital Sign     Days of Exercise per Week: 1 day   Stress: No Stress Concern Present (1/5/2024)    [place] [place] [employer] - Occupational Stress Questionnaire     Feeling of Stress : Not at all   Housing Stability: Unknown (1/5/2024)    Housing Stability Vital Sign     Unable to Pay for Housing in the Last Year: No     Unstable Housing in the Last Year: No     Family History   Problem Relation Name Age of Onset    Atrial fibrillation Mother      Heart failure Father      No Known Problems Sister      No Known Problems Brother         Review of Systems   Eyes: Negative.    Respiratory: Negative.     Cardiovascular: Negative.    Gastrointestinal: Negative.    Genitourinary: Negative.    Neurological: Negative.            Objective:   /62 (BP Location: Left arm, Patient Position: Sitting, BP Method: Small (Manual))   Pulse (!) 52   Resp 18   Ht 5' 2.01" (1.575 m)   Wt 64.4 kg (142 lb)   SpO2 100%   BMI 25.97 kg/m²     Physical Exam  Constitutional:       General: She is not in acute distress.     Appearance: Normal appearance.   HENT:      Head: Normocephalic and atraumatic.   Eyes:      General: No scleral icterus.     Conjunctiva/sclera: Conjunctivae normal.   Neck:      Vascular: No carotid bruit.   Cardiovascular:      Rate and Rhythm: Normal rate and regular rhythm.      Pulses: Normal pulses.      Heart sounds: Normal heart sounds. No murmur heard.     No friction rub. No gallop.   Pulmonary:      Effort: Pulmonary effort is normal.      Breath sounds: Normal breath sounds.   Abdominal:      General: Bowel sounds are normal.      Palpations: Abdomen is soft. There is no mass.      Tenderness: There is no abdominal tenderness. There is no guarding or rebound.   Musculoskeletal:         General: No deformity.      Cervical back: No rigidity or tenderness.      Right lower leg: No edema.      Left lower leg: No edema. "   Lymphadenopathy:      Cervical: No cervical adenopathy.   Skin:     Coloration: Skin is not jaundiced or pale.      Findings: No erythema.   Neurological:      General: No focal deficit present.      Mental Status: She is alert and oriented to person, place, and time.      Gait: Gait normal.   Psychiatric:         Mood and Affect: Mood normal.         Behavior: Behavior normal.         Thought Content: Thought content normal.         Judgment: Judgment normal.         Assessment and Plan:     Encounter for Medicare annual wellness exam  Health Maintenance Due   Topic Date Due    TETANUS VACCINE  Never done    Shingles Vaccine (1 of 2) Never done    RSV Vaccine (Age 60+ and Pregnant patients) (1 - 1-dose 60+ series) Never done    COVID-19 Vaccine (3 - 2023-24 season) 09/01/2023     We discussed recommended vaccines.  She thinks she's had a td within 10 years.    Advance Care Planning    Date: 07/16/2024  Patient did not wish or was not able to name a surrogate decision maker or provide an Advance Care Plan.  She alrady has a medical poa and living will.           Stage 3b chronic kidney disease  She reports she drinks plenty of water and doesn't take nsaids.  Continue to monitor.    Hypertension  Controlled.  Continue valsartan hct and amlodipine.    Hyperlipidemia  Trial of crestor, recheck 6 mos.    Gastroesophageal reflux disease  Prilosec has worked well for her in the past.  I rec she get back on that prn.  Call if not helping and watch diet.     Follow up in about 6 months (around 1/16/2025).    Medications Ordered This Encounter   Medications    rosuvastatin (CRESTOR) 5 MG tablet     Sig: Take 1 tablet (5 mg total) by mouth every evening.     Dispense:  90 tablet     Refill:  3     [unfilled]  Orders Placed This Encounter   Procedures    Comprehensive Metabolic Panel     Standing Status:   Future     Standing Expiration Date:   9/14/2025    Lipid Panel     Standing Status:   Future     Standing Expiration  Date:   1/16/2026         A comprehensive HEALTH RISK ASSESSMENT was completed today. Results are summarized below:    There are NO EMOTIONAL/SOCIAL CONCERNS identified on today's screening for Social Isolation, Depression and Anxiety.    There are NO COGNITIVE FUNCTION CONCERNS identified on today's screening.  There are NO FUNCTIONAL OR SAFETY CONCERNS were identified on today's screening for Physical Symptoms, Nutritional, Cognitive Function, Home Safety/Living Situation, Fall Risk, Activities of Daily Living, Independent Activities of Daily Living, Physical Activity, Timed Up and Go test and Whisper test.   The patient reports NO OPIOID PRESCRIPTIONS. This was confirmed through medication reconciliation and the [facility] website.    The patient is NOT A TOBACCO USER.  The patient reports NO SIGNIFICANT ALCOHOL USE.     All Questions regarding food, transportation or housing were not answered today.    Follow up in about 6 months (around 1/16/2025). In addition to their scheduled follow up, the patient has also been instructed to follow up on as needed basis.

## 2024-07-16 NOTE — ASSESSMENT & PLAN NOTE
Health Maintenance Due   Topic Date Due    TETANUS VACCINE  Never done    Shingles Vaccine (1 of 2) Never done    RSV Vaccine (Age 60+ and Pregnant patients) (1 - 1-dose 60+ series) Never done    COVID-19 Vaccine (3 - 2023-24 season) 09/01/2023     We discussed recommended vaccines.  She thinks she's had a td within 10 years.    Advance Care Planning     Date: 07/16/2024  Patient did not wish or was not able to name a surrogate decision maker or provide an Advance Care Plan.  She alrady has a medical poa and living will.

## 2024-07-16 NOTE — ASSESSMENT & PLAN NOTE
Prilosec has worked well for her in the past.  I rec she get back on that prn.  Call if not helping and watch diet.

## 2024-07-22 DIAGNOSIS — I10 PRIMARY HYPERTENSION: Primary | ICD-10-CM

## 2024-07-22 RX ORDER — VALSARTAN AND HYDROCHLOROTHIAZIDE 320; 25 MG/1; MG/1
1 TABLET, FILM COATED ORAL
Qty: 90 TABLET | Refills: 3 | Status: SHIPPED | OUTPATIENT
Start: 2024-07-22

## 2024-08-06 ENCOUNTER — OFFICE VISIT (OUTPATIENT)
Dept: INTERNAL MEDICINE | Facility: CLINIC | Age: 89
End: 2024-08-06
Payer: MEDICARE

## 2024-08-06 ENCOUNTER — HOSPITAL ENCOUNTER (OUTPATIENT)
Dept: RADIOLOGY | Facility: HOSPITAL | Age: 89
Discharge: HOME | End: 2024-08-06
Attending: INTERNAL MEDICINE
Payer: MEDICARE

## 2024-08-06 VITALS
RESPIRATION RATE: 18 BRPM | OXYGEN SATURATION: 97 % | DIASTOLIC BLOOD PRESSURE: 70 MMHG | TEMPERATURE: 98 F | HEART RATE: 71 BPM | SYSTOLIC BLOOD PRESSURE: 158 MMHG | BODY MASS INDEX: 26.31 KG/M2 | HEIGHT: 62 IN | WEIGHT: 143 LBS

## 2024-08-06 DIAGNOSIS — R05.9 COUGH, UNSPECIFIED TYPE: ICD-10-CM

## 2024-08-06 DIAGNOSIS — R05.9 COUGH, UNSPECIFIED TYPE: Primary | ICD-10-CM

## 2024-08-06 PROCEDURE — 99213 OFFICE O/P EST LOW 20 MIN: CPT | Mod: ,,, | Performed by: INTERNAL MEDICINE

## 2024-08-06 PROCEDURE — 71046 X-RAY EXAM CHEST 2 VIEWS: CPT | Mod: TC

## 2024-08-06 RX ORDER — DOXYCYCLINE HYCLATE 100 MG
TABLET ORAL
COMMUNITY
Start: 2024-08-03

## 2024-08-06 RX ORDER — METHYLPREDNISOLONE 4 MG/1
TABLET ORAL
Qty: 21 EACH | Refills: 0 | Status: SHIPPED | OUTPATIENT
Start: 2024-08-06

## 2024-08-06 RX ORDER — BENZONATATE 100 MG/1
CAPSULE ORAL
COMMUNITY
Start: 2024-08-03

## 2024-08-26 ENCOUNTER — LAB VISIT (OUTPATIENT)
Dept: LAB | Facility: HOSPITAL | Age: 89
End: 2024-08-26
Attending: INTERNAL MEDICINE
Payer: MEDICARE

## 2024-08-26 ENCOUNTER — TELEPHONE (OUTPATIENT)
Dept: INTERNAL MEDICINE | Facility: CLINIC | Age: 89
End: 2024-08-26
Payer: MEDICARE

## 2024-08-26 DIAGNOSIS — R31.9 HEMATURIA, UNSPECIFIED TYPE: ICD-10-CM

## 2024-08-26 DIAGNOSIS — R30.0 DYSURIA: Primary | ICD-10-CM

## 2024-08-26 LAB
BACTERIA #/AREA URNS AUTO: ABNORMAL /HPF
BILIRUB UR QL STRIP.AUTO: ABNORMAL
CLARITY UR: ABNORMAL
COLOR UR AUTO: ABNORMAL
GLUCOSE UR QL STRIP: ABNORMAL
HGB UR QL STRIP: ABNORMAL
KETONES UR QL STRIP: ABNORMAL
LEUKOCYTE ESTERASE UR QL STRIP: ABNORMAL
NITRITE UR QL STRIP: ABNORMAL
PH UR STRIP: ABNORMAL [PH]
PROT UR QL STRIP: ABNORMAL
RBC #/AREA URNS AUTO: >100 /HPF
SP GR UR STRIP.AUTO: 1.01 (ref 1–1.03)
SQUAMOUS #/AREA URNS LPF: ABNORMAL /HPF
UROBILINOGEN UR STRIP-ACNC: ABNORMAL
WBC #/AREA URNS AUTO: ABNORMAL /HPF

## 2024-08-26 PROCEDURE — 81001 URINALYSIS AUTO W/SCOPE: CPT

## 2024-08-26 RX ORDER — SULFAMETHOXAZOLE AND TRIMETHOPRIM 800; 160 MG/1; MG/1
1 TABLET ORAL 2 TIMES DAILY
Qty: 10 TABLET | Refills: 0 | Status: SHIPPED | OUTPATIENT
Start: 2024-08-26 | End: 2024-08-31

## 2024-08-26 NOTE — TELEPHONE ENCOUNTER
----- Message from [facility] sent at 8/26/2024  2:12 PM CDT -----  Regarding: refill  .Type:  RX Refill Request    Who Called:  pt    Refill or New Rx: refill    RX Name and Strength:  sulfamethoxazole-trimethoprim 800-160mg (BACTRIM DS) 800-160 mg Tab (Discontinued) 6 tablet 0 3/11/2024 7/16/2024 No  Sig - Route: Take 1 tablet by mouth 2 (two) times daily.      How is the patient currently taking it? (ex. 1XDay): 2 day    Is this a 30 day or 90 day RX: 60    Preferred Pharmacy with phone number:  [facility] - [place], [address]   Phone: [phone]  Fax: [phone]        Local or Mail Order: local    Ordering Provider: Jennifer    Would the patient rather a call back or a response via [facility]?  [place]    Best Call Back Number: [phone]    Additional Information: refill  pt states she is out of medication thanks she needs it today if possible

## 2024-08-26 NOTE — TELEPHONE ENCOUNTER
Per manohar Thomas to send bactrim DS BID X5 days. And order UA. Patient notified and verbalized understanding. Rx [facility].

## 2024-08-26 NOTE — TELEPHONE ENCOUNTER
Patient c/o blood in her urine, started at lunch time today.  No other symptoms.  Patient stated she had the same thing back in March and was prescribed Bactrim DS and the issue went away.  Patient requesting a new rx be sent to the pharmacy.

## 2024-08-28 NOTE — PROGRESS NOTES
Let her there was too much blood in the urine for them to accurately intepret the u/a.  Is she doing better?

## 2024-09-05 DIAGNOSIS — I10 PRIMARY HYPERTENSION: Primary | ICD-10-CM

## 2024-09-05 RX ORDER — AMLODIPINE BESYLATE 5 MG/1
5 TABLET ORAL
Qty: 90 TABLET | Refills: 3 | Status: SHIPPED | OUTPATIENT
Start: 2024-09-05

## 2024-10-21 PROBLEM — Z00.00 ENCOUNTER FOR MEDICARE ANNUAL WELLNESS EXAM: Status: RESOLVED | Noted: 2024-07-16 | Resolved: 2024-10-21

## 2024-12-17 ENCOUNTER — OFFICE VISIT (OUTPATIENT)
Dept: URGENT CARE | Facility: CLINIC | Age: 89
End: 2024-12-17
Payer: MEDICARE

## 2024-12-17 VITALS
BODY MASS INDEX: 26.31 KG/M2 | RESPIRATION RATE: 18 BRPM | WEIGHT: 143 LBS | SYSTOLIC BLOOD PRESSURE: 151 MMHG | HEIGHT: 62 IN | DIASTOLIC BLOOD PRESSURE: 69 MMHG | OXYGEN SATURATION: 99 % | TEMPERATURE: 99 F | HEART RATE: 82 BPM

## 2024-12-17 DIAGNOSIS — M17.11 OSTEOARTHRITIS OF RIGHT KNEE, UNSPECIFIED OSTEOARTHRITIS TYPE: ICD-10-CM

## 2024-12-17 DIAGNOSIS — I10 HYPERTENSION, UNSPECIFIED TYPE: ICD-10-CM

## 2024-12-17 DIAGNOSIS — J06.9 ACUTE URI: Primary | ICD-10-CM

## 2024-12-17 PROCEDURE — 99214 OFFICE O/P EST MOD 30 MIN: CPT | Mod: 25,,, | Performed by: FAMILY MEDICINE

## 2024-12-17 PROCEDURE — 96372 THER/PROPH/DIAG INJ SC/IM: CPT | Mod: ,,, | Performed by: FAMILY MEDICINE

## 2024-12-17 RX ORDER — BENZONATATE 200 MG/1
200 CAPSULE ORAL 3 TIMES DAILY PRN
Qty: 15 CAPSULE | Refills: 0 | Status: SHIPPED | OUTPATIENT
Start: 2024-12-17 | End: 2024-12-22

## 2024-12-17 RX ORDER — BETAMETHASONE SODIUM PHOSPHATE AND BETAMETHASONE ACETATE 3; 3 MG/ML; MG/ML
6 INJECTION, SUSPENSION INTRA-ARTICULAR; INTRALESIONAL; INTRAMUSCULAR; SOFT TISSUE
Status: COMPLETED | OUTPATIENT
Start: 2024-12-17 | End: 2024-12-17

## 2024-12-17 RX ORDER — DICLOFENAC SODIUM 10 MG/G
2 GEL TOPICAL 2 TIMES DAILY
Qty: 50 G | Refills: 0 | Status: SHIPPED | OUTPATIENT
Start: 2024-12-17

## 2024-12-17 RX ADMIN — BETAMETHASONE SODIUM PHOSPHATE AND BETAMETHASONE ACETATE 6 MG: 3; 3 INJECTION, SUSPENSION INTRA-ARTICULAR; INTRALESIONAL; INTRAMUSCULAR; SOFT TISSUE at 10:12

## 2024-12-17 NOTE — PATIENT INSTRUCTIONS
Discussed the physical finding , Condition and course  Celestone IM today as anti inflammation for symptom relief, risk and benefits discussed voiced understanding  Warm saltwater gargles for sore throat.   Claritin 10 mg or Zyrtec 10 mg for nasal congestion  Coricidin HBP for cough and cold as needed and as directed    Discussed in detail on osteoarthritis.  Trial of medication as directed.  Monitor the symptoms.  Activities as tolerated.  Voiced understanding    Blood pressure elevated in the clinic.  Coricidin HBP for cough and cold or Tessalon Perles as directed  Call or return to clinic for any questions.  Follow up with primary MD as needed   Cheiloplasty (Complex) Text: A decision was made to reconstruct the defect with a  cheiloplasty.  The defect was undermined extensively.  Additional obicularis oris muscle was excised with a 15 blade scalpel.  The defect was converted into a full thickness wedge to facilite a better cosmetic result.  Small vessels were then tied off with 5-0 monocyrl. The obicularis oris, superficial fascia, adipose and dermis were then reapproximated.  After the deeper layers were approximated the epidermis was reapproximated with particular care given to realign the vermilion border.

## 2024-12-17 NOTE — PROGRESS NOTES
"Subjective:      Patient ID: Kiarra Arredondo is a 89 y.o. female.    Vitals:  height is 5' 2" (1.575 m) and weight is 64.9 kg (143 lb). Her oral temperature is 98.5 °F (36.9 °C). Her blood pressure is 151/69 (abnormal) and her pulse is 82. Her respiration is 18 and oxygen saturation is 99%.     Chief Complaint: Cough     Patient is a 89 y.o. female who presents to urgent care with complaints of sneezing, runny nose, congestion, slight cough x 3 days.  Patient denies fever, chills, body aches, nausea, vomiting, diarrhea.      Cough      Respiratory:  Positive for cough.       [place]:  89-year-old female with known history of hypertension and hyperlipidemia currently on medications.  Follows up with primary MD.  Blood pressure elevated in the clinic.  Present to clinic with 3 days history of nasal congestion, sinus congestion, sneezing, runny nose and coughing.  No measured fever.  No concerns of positive exposure to infections, defers testing today.  Requesting for cortisone injection as it helped in the past, states she has a graduation to attend this weekend.  Understands the risks and benefits of injection  Patient also complains of right knee pain, history of osteoarthritis.  Follows up with ortho for injections.  Appears concerned with worsening symptoms.    ROS:  Constitutional : _No fatigue, no Fever  HEENT : _No sore throat, no ear pain, + sinus congestion  Neck : No pain, range of motion present  Respiratory : _Coughing, mainly from the drip, no shortness a breath, no chest pain  Cardiovascular : _No chest pain, no palpitations  Gastrointestinal : _No vomiting or diarrhea. No abdominal pain  Musculoskeletal:  Right knee discomfort, chronic.  Continues to walk with full weight-bearing.  Denies tingling numbness or weakness to lower extremities.  History of osteoarthritis  Integumentary : _No skin rash or abnormal lesion  Neurologic_No headache, No dizziness   Objective:     Physical Exam  General : Alert and " Oriented, No apparent distress, afebrile, appears comfortable sitting on the exam table, mildly congested  Neck - supple  HENT : Oropharynx no redness or swelling.  Bilateral TMs intact mild fluid no redness.   Respiratory : Bilateral equal breath sounds, nonlabored respirations  Cardiovascular : Rate, rhythm regular, normal volume pulse, no murmur  Gastrointestinal: Full abdomen, soft, nontender to palpate  Musculoskeletal:  Bilateral knees symmetrical.  Night knee no visible deformity, no redness, no swelling.  Range of motion present.  No point tenderness  Integumentary : Warm, Dry and no rash    Assessment:     1. Acute URI    2. Osteoarthritis of right knee, unspecified osteoarthritis type    3. Hypertension, unspecified type      Plan:   Discussed the physical finding , Condition and course  Celestone IM today as anti inflammation for symptom relief, risk and benefits discussed voiced understanding  Warm saltwater gargles for sore throat.   Claritin 10 mg or Zyrtec 10 mg for nasal congestion  Coricidin HBP for cough and cold as needed and as directed    Discussed in detail on osteoarthritis.  Trial of medication as directed.  Monitor the symptoms.  Activities as tolerated.  Voiced understanding    Blood pressure elevated in the clinic.  Coricidin HBP for cough and cold or Tessalon Perles as directed  Call or return to clinic for any questions.  Follow up with primary MD as needed      Acute URI  -     betamethasone acetate-betamethasone sodium phosphate injection 6 mg  -     benzonatate (TESSALON) 200 MG capsule; Take 1 capsule (200 mg total) by mouth 3 (three) times daily as needed for Cough.  Dispense: 15 capsule; Refill: 0    Osteoarthritis of right knee, unspecified osteoarthritis type  -     diclofenac sodium (VOLTAREN ARTHRITIS PAIN) 1 % Gel; Apply 2 g topically 2 (two) times daily. As needed for knee pain  Dispense: 50 g; Refill: 0    Hypertension, unspecified type

## 2025-01-15 ENCOUNTER — TELEPHONE (OUTPATIENT)
Dept: INTERNAL MEDICINE | Facility: CLINIC | Age: OVER 89
End: 2025-01-15
Payer: MEDICARE

## 2025-01-15 NOTE — TELEPHONE ENCOUNTER
----- Message from Nurse Guillen sent at 1/15/2025 12:51 PM CST -----  Regarding: BALDEMAR Rodriguez 1/22/25 @0940  Are there any outstanding tasks in the chart? Patient will need fasting labs    Is there any documentation of tasks? no    Please tell patient to bring living will, power of , or advance directive document to visit if they have it.

## 2025-01-16 ENCOUNTER — LAB VISIT (OUTPATIENT)
Dept: LAB | Facility: HOSPITAL | Age: OVER 89
End: 2025-01-16
Attending: INTERNAL MEDICINE
Payer: MEDICARE

## 2025-01-16 DIAGNOSIS — E78.5 HYPERLIPIDEMIA, UNSPECIFIED HYPERLIPIDEMIA TYPE: ICD-10-CM

## 2025-01-16 LAB
ALBUMIN SERPL-MCNC: 4.3 G/DL (ref 3.4–4.8)
ALBUMIN/GLOB SERPL: 1.6 RATIO (ref 1.1–2)
ALP SERPL-CCNC: 85 UNIT/L (ref 40–150)
ALT SERPL-CCNC: 21 UNIT/L (ref 0–55)
ANION GAP SERPL CALC-SCNC: 7 MEQ/L
AST SERPL-CCNC: 21 UNIT/L (ref 5–34)
BILIRUB SERPL-MCNC: 0.6 MG/DL
BUN SERPL-MCNC: 26.4 MG/DL (ref 9.8–20.1)
CALCIUM SERPL-MCNC: 9.6 MG/DL (ref 8.4–10.2)
CHLORIDE SERPL-SCNC: 100 MMOL/L (ref 98–107)
CHOLEST SERPL-MCNC: 177 MG/DL
CHOLEST/HDLC SERPL: 2 {RATIO} (ref 0–5)
CO2 SERPL-SCNC: 28 MMOL/L (ref 23–31)
CREAT SERPL-MCNC: 1.11 MG/DL (ref 0.55–1.02)
CREAT/UREA NIT SERPL: 24
GFR SERPLBLD CREATININE-BSD FMLA CKD-EPI: 48 ML/MIN/1.73/M2
GLOBULIN SER-MCNC: 2.7 GM/DL (ref 2.4–3.5)
GLUCOSE SERPL-MCNC: 95 MG/DL (ref 82–115)
HDLC SERPL-MCNC: 79 MG/DL (ref 35–60)
LDLC SERPL CALC-MCNC: 84 MG/DL (ref 50–140)
POTASSIUM SERPL-SCNC: 5.1 MMOL/L (ref 3.5–5.1)
PROT SERPL-MCNC: 7 GM/DL (ref 5.8–7.6)
SODIUM SERPL-SCNC: 135 MMOL/L (ref 136–145)
TRIGL SERPL-MCNC: 72 MG/DL (ref 37–140)
VLDLC SERPL CALC-MCNC: 14 MG/DL

## 2025-01-16 PROCEDURE — 80053 COMPREHEN METABOLIC PANEL: CPT

## 2025-01-16 PROCEDURE — 36415 COLL VENOUS BLD VENIPUNCTURE: CPT

## 2025-01-16 PROCEDURE — 80061 LIPID PANEL: CPT

## 2025-01-28 ENCOUNTER — OFFICE VISIT (OUTPATIENT)
Dept: INTERNAL MEDICINE | Facility: CLINIC | Age: OVER 89
End: 2025-01-28
Payer: MEDICARE

## 2025-01-28 VITALS
HEIGHT: 62 IN | SYSTOLIC BLOOD PRESSURE: 150 MMHG | RESPIRATION RATE: 18 BRPM | WEIGHT: 147 LBS | OXYGEN SATURATION: 96 % | BODY MASS INDEX: 27.05 KG/M2 | HEART RATE: 60 BPM | DIASTOLIC BLOOD PRESSURE: 62 MMHG

## 2025-01-28 DIAGNOSIS — N18.32 STAGE 3B CHRONIC KIDNEY DISEASE: ICD-10-CM

## 2025-01-28 DIAGNOSIS — K21.9 GASTROESOPHAGEAL REFLUX DISEASE WITHOUT ESOPHAGITIS: ICD-10-CM

## 2025-01-28 DIAGNOSIS — E78.5 HYPERLIPIDEMIA, UNSPECIFIED HYPERLIPIDEMIA TYPE: ICD-10-CM

## 2025-01-28 DIAGNOSIS — N28.1 RENAL CYST: ICD-10-CM

## 2025-01-28 DIAGNOSIS — I10 PRIMARY HYPERTENSION: Primary | ICD-10-CM

## 2025-01-28 PROCEDURE — 99214 OFFICE O/P EST MOD 30 MIN: CPT | Mod: ,,, | Performed by: INTERNAL MEDICINE

## 2025-01-28 RX ORDER — OMEPRAZOLE 40 MG/1
40 CAPSULE, DELAYED RELEASE ORAL DAILY
Qty: 90 CAPSULE | Refills: 3 | Status: SHIPPED | OUTPATIENT
Start: 2025-01-28 | End: 2026-01-28

## 2025-01-28 NOTE — PROGRESS NOTES
Subjective:      Chief Complaint: Follow-up (6mo/Discuss labs )      HPI:She is here for f/u htn and ckd.  She isn't checking her BP at home.  She has knee pain in her right knee, but she doesn't want to do the surgery.  She recently had a gel injection by Dr. Garza's NP.    She feels like her stamina isn't what it used to be.  She is having some GERD sx.  She gets a burning sensation in her chest and it feel like its going to come up. Its usually after meals.  She is just taking tums prn.  Occ she takes prilosec.   She does have a cough.  Problem Noted   Renal Cyst 1/28/2025   Cough 8/6/2024   Stage 3b Chronic Kidney Disease 7/15/2024   Gastroesophageal Reflux Disease 8/2/2022   Hypertension 8/2/2022   Mitral Valve Prolapse 8/2/2022   Hyperlipidemia 8/2/2022   Encounter for Medicare Annual Wellness Exam (Resolved) 7/16/2024   Age-Related Cataract (Resolved) 8/2/2022        The patient's Health Maintenance was reviewed and the following appears to be due:   Health Maintenance Due   Topic Date Due    TETANUS VACCINE  Never done    Shingles Vaccine (1 of 2) Never done    RSV Vaccine (Age 60+ and Pregnant patients) (1 - 1-dose 75+ series) Never done    Influenza Vaccine (1) 09/01/2024    COVID-19 Vaccine (3 - 2024-25 season) 09/01/2024       Past Medical History:  Past Medical History:   Diagnosis Date    Age-related cataract 08/02/2022    Arthritis     GERD (gastroesophageal reflux disease)     HTN (hypertension)     Mitral valve prolapse      Review of patient's allergies indicates:   Allergen Reactions    Codeine      Other reaction(s): Dizzy     Current Outpatient Medications on File Prior to Visit   Medication Sig Dispense Refill    amLODIPine (NORVASC) 5 MG tablet TAKE ONE TABLET ONCE DAILY 90 tablet 3    diclofenac sodium (VOLTAREN ARTHRITIS PAIN) 1 % Gel Apply 2 g topically 2 (two) times daily. As needed for knee pain 50 g 0    rosuvastatin (CRESTOR) 5 MG tablet Take 1 tablet (5 mg total) by mouth every  "evening. 90 tablet 3    valsartan-hydrochlorothiazide (DIOVAN-HCT) 320-25 mg per tablet TAKE ONE TABLET ONCE DAILY 90 tablet 3     No current facility-administered medications on file prior to visit.       Review of Systems   Constitutional:  Positive for fatigue.   Respiratory:  Positive for shortness of breath (if she exerts herself too much).    Cardiovascular:  Negative for chest pain.       Objective:   BP (!) 150/62 (BP Location: Right arm, Patient Position: Sitting)   Pulse 60   Resp 18   Ht 5' 2.01" (1.575 m)   Wt 66.7 kg (147 lb)   SpO2 96%   BMI 26.88 kg/m²     Physical Exam  Vitals reviewed.   Constitutional:       General: She is not in acute distress.     Appearance: Normal appearance. She is not ill-appearing or diaphoretic.   HENT:      Head: Normocephalic and atraumatic.   Neck:      Vascular: No carotid bruit.   Cardiovascular:      Rate and Rhythm: Normal rate and regular rhythm.      Heart sounds: Normal heart sounds.   Pulmonary:      Effort: Pulmonary effort is normal.      Breath sounds: Normal breath sounds.   Musculoskeletal:      Right lower leg: No edema.      Left lower leg: No edema.   Skin:     General: Skin is warm and dry.   Neurological:      General: No focal deficit present.      Mental Status: She is alert.   Psychiatric:         Mood and Affect: Mood normal.         Behavior: Behavior normal.         Thought Content: Thought content normal.         Judgment: Judgment normal.       Assessment and Plan:     Stage 3b chronic kidney disease  Stable.      Hypertension  Not at goal.  Continue current meds.  Monitor daily and RTC with log.  We discussed dietary and exercise changes to try and lower the BP.    Renal cyst  F/u with u/s    Hyperlipidemia  Improved, continue crestor.    Gastroesophageal reflux disease  Trial of daily omeprazole.  Revisit 1 mo      Follow up in about 4 weeks (around 2/25/2025).    Medications Ordered This Encounter   Medications    omeprazole (PRILOSEC) " 40 MG capsule     Sig: Take 1 capsule (40 mg total) by mouth once daily.     Dispense:  90 capsule     Refill:  3     [unfilled]  Orders Placed This Encounter   Procedures    US Retroperitoneal Complete     Standing Status:   Future     Standing Expiration Date:   1/28/2026     Order Specific Question:   May the Radiologist modify the order per protocol to meet the clinical needs of the patient?     Answer:   Yes     Order Specific Question:   Release to patient     Answer:   Immediate

## 2025-01-28 NOTE — ASSESSMENT & PLAN NOTE
Not at goal.  Continue current meds.  Monitor daily and RTC with log.  We discussed dietary and exercise changes to try and lower the BP.

## 2025-02-07 ENCOUNTER — HOSPITAL ENCOUNTER (OUTPATIENT)
Dept: RADIOLOGY | Facility: HOSPITAL | Age: OVER 89
Discharge: HOME | End: 2025-02-07
Attending: INTERNAL MEDICINE
Payer: MEDICARE

## 2025-02-07 DIAGNOSIS — N28.1 RENAL CYST: ICD-10-CM

## 2025-02-07 PROCEDURE — 76770 US EXAM ABDO BACK WALL COMP: CPT | Mod: TC

## 2025-02-18 ENCOUNTER — TELEPHONE (OUTPATIENT)
Dept: INTERNAL MEDICINE | Facility: CLINIC | Age: OVER 89
End: 2025-02-18
Payer: MEDICARE

## 2025-02-18 NOTE — TELEPHONE ENCOUNTER
----- Message from Nurse Guillen sent at 2/18/2025  8:16 AM CST -----  Regarding: BALDEMAR Rodriguez 2/25/25 @0840  Are there any outstanding tasks in the chart? noIs there any documentation of tasks? noPlease tell patient to bring living will, power of , or advance directive document to visit if they have it.

## 2025-02-25 ENCOUNTER — OFFICE VISIT (OUTPATIENT)
Dept: INTERNAL MEDICINE | Facility: CLINIC | Age: OVER 89
End: 2025-02-25
Payer: MEDICARE

## 2025-02-25 VITALS
SYSTOLIC BLOOD PRESSURE: 124 MMHG | WEIGHT: 147 LBS | DIASTOLIC BLOOD PRESSURE: 70 MMHG | OXYGEN SATURATION: 94 % | HEIGHT: 62 IN | RESPIRATION RATE: 16 BRPM | HEART RATE: 73 BPM | BODY MASS INDEX: 27.05 KG/M2

## 2025-02-25 DIAGNOSIS — I10 PRIMARY HYPERTENSION: ICD-10-CM

## 2025-02-25 DIAGNOSIS — N18.32 STAGE 3B CHRONIC KIDNEY DISEASE: ICD-10-CM

## 2025-02-25 DIAGNOSIS — E78.5 HYPERLIPIDEMIA, UNSPECIFIED HYPERLIPIDEMIA TYPE: ICD-10-CM

## 2025-02-25 DIAGNOSIS — G47.00 INSOMNIA, UNSPECIFIED TYPE: Primary | ICD-10-CM

## 2025-02-25 DIAGNOSIS — N28.1 RENAL CYST: ICD-10-CM

## 2025-02-25 PROCEDURE — 99214 OFFICE O/P EST MOD 30 MIN: CPT | Mod: ,,, | Performed by: INTERNAL MEDICINE

## 2025-02-25 RX ORDER — ESZOPICLONE 2 MG/1
2 TABLET, FILM COATED ORAL NIGHTLY PRN
Qty: 30 TABLET | Refills: 0 | Status: SHIPPED | OUTPATIENT
Start: 2025-02-25 | End: 2025-03-27

## 2025-02-25 RX ORDER — AMLODIPINE BESYLATE 10 MG/1
10 TABLET ORAL DAILY
Qty: 30 TABLET | Refills: 6 | Status: SHIPPED | OUTPATIENT
Start: 2025-02-25

## 2025-02-25 NOTE — ASSESSMENT & PLAN NOTE
Will try to increase the amlodipine to 10 mg daily.  Continue diovan hct.  We discussed the potential s.e. of leg swelling.  She was advised to call if this is an issue.  Continue to monitor BP at home.

## 2025-02-25 NOTE — PROGRESS NOTES
Subjective:      Chief Complaint: 4 week rv- blood pressure      HPI:  History of Present Illness    CHIEF COMPLAINT:  Patient presents today for follow up of blood pressure management.    HYPERTENSION:  She has been monitoring blood pressure at home for 1 month and takes her blood pressure medication 2-3 hours after waking. She expresses concern about the fit of her home blood pressure cuff, stating it may not be tight enough. She reports sock impressions visible on legs, with increased severity at night.  BP at home has been 130-140s/60s mostly.    SLEEP:  She experiences significant insomnia 1-2 times per week, during which she is completely unable to sleep. She consumes one cup of caffeine in the morning only. Melatonin supplementation has not provided significant improvement in symptoms.    SOCIAL HISTORY:  She has reduced alcohol consumption since spouse's death, now drinking wine only 1-2 times per week on weekends compared to previous daily consumption.      ROS:  ROS as indicated in HPI.         Problem Noted   Insomnia 2/25/2025   Renal Cyst 1/28/2025    U/S confirms 2 simple cysts.     Cough 8/6/2024   Stage 3b Chronic Kidney Disease 7/15/2024   Gastroesophageal Reflux Disease 8/2/2022   Hypertension 8/2/2022   Mitral Valve Prolapse 8/2/2022   Hyperlipidemia 8/2/2022   Encounter for Medicare Annual Wellness Exam (Resolved) 7/16/2024   Age-Related Cataract (Resolved) 8/2/2022        The patient's Health Maintenance was reviewed and the following appears to be due:   Health Maintenance Due   Topic Date Due    TETANUS VACCINE  Never done    Shingles Vaccine (1 of 2) Never done    RSV Vaccine (Age 60+ and Pregnant patients) (1 - 1-dose 75+ series) Never done    Influenza Vaccine (1) 09/01/2024    COVID-19 Vaccine (3 - 2024-25 season) 09/01/2024       Past Medical History:  Past Medical History:   Diagnosis Date    Age-related cataract 08/02/2022    Arthritis     GERD (gastroesophageal reflux disease)     HTN  "(hypertension)     Mitral valve prolapse      Review of patient's allergies indicates:   Allergen Reactions    Codeine      Other reaction(s): Dizzy     Medications Ordered Prior to Encounter[1]    Review of Systems    Objective:   /70 (BP Location: Left arm, Patient Position: Sitting)   Pulse 73   Resp 16   Ht 5' 2" (1.575 m)   Wt 66.7 kg (147 lb)   SpO2 (!) 94%   BMI 26.89 kg/m²     Physical Exam  Vitals reviewed.   Constitutional:       General: She is not in acute distress.     Appearance: Normal appearance. She is not ill-appearing or diaphoretic.   HENT:      Head: Normocephalic and atraumatic.   Pulmonary:      Effort: Pulmonary effort is normal.   Skin:     General: Skin is warm and dry.   Neurological:      General: No focal deficit present.      Mental Status: She is alert.   Psychiatric:         Mood and Affect: Mood normal.         Behavior: Behavior normal.         Thought Content: Thought content normal.         Judgment: Judgment normal.       Assessment and Plan:     Hypertension  Will try to increase the amlodipine to 10 mg daily.  Continue diovan hct.  We discussed the potential s.e. of leg swelling.  She was advised to call if this is an issue.  Continue to monitor BP at home.    Stage 3b chronic kidney disease  Renal function is stable.  Increase fluid intake.    Renal cyst  No further intervention needed.    Insomnia  Rx for lunesta prn.    Hyperlipidemia  Lipids improved.  Continue crestor.      Follow up in about 3 months (around 5/25/2025).    Medications Ordered This Encounter   Medications    amLODIPine (NORVASC) 10 MG tablet     Sig: Take 1 tablet (10 mg total) by mouth once daily.     Dispense:  30 tablet     Refill:  6     This prescription was filled on 9/5/2024. Any refills authorized will be placed on file. - .    eszopiclone (LUNESTA) 2 MG Tab     Sig: Take 1 tablet (2 mg total) by mouth nightly as needed (sleep).     Dispense:  30 tablet     Refill:  0     [unfilled]  No " orders of the defined types were placed in this encounter.    This note was generated with the assistance of ambient listening technology. Verbal consent was obtained by the patient and accompanying visitor(s) for the recording of patient appointment to facilitate this note. I attest to having reviewed and edited the generated note for accuracy, though some syntax or spelling errors may persist. Please contact the author of this note for any clarification.           [1]   Current Outpatient Medications on File Prior to Visit   Medication Sig Dispense Refill    diclofenac sodium (VOLTAREN ARTHRITIS PAIN) 1 % Gel Apply 2 g topically 2 (two) times daily. As needed for knee pain 50 g 0    omeprazole (PRILOSEC) 40 MG capsule Take 1 capsule (40 mg total) by mouth once daily. 90 capsule 3    rosuvastatin (CRESTOR) 5 MG tablet Take 1 tablet (5 mg total) by mouth every evening. 90 tablet 3    valsartan-hydrochlorothiazide (DIOVAN-HCT) 320-25 mg per tablet TAKE ONE TABLET ONCE DAILY 90 tablet 3    [DISCONTINUED] amLODIPine (NORVASC) 5 MG tablet TAKE ONE TABLET ONCE DAILY 90 tablet 3     No current facility-administered medications on file prior to visit.

## 2025-03-17 ENCOUNTER — TELEPHONE (OUTPATIENT)
Dept: INTERNAL MEDICINE | Facility: CLINIC | Age: OVER 89
End: 2025-03-17
Payer: MEDICARE

## 2025-03-17 DIAGNOSIS — I10 PRIMARY HYPERTENSION: Primary | ICD-10-CM

## 2025-03-17 NOTE — TELEPHONE ENCOUNTER
"Solomon Camilo \"Konrad\" is a 76 y.o. male who presents for the following: Skin Exam.  He notes a brown spot on his left forehead, which has been present for several years, but has increased in size and gotten darker in color.  He also notes a raised, scaly, rough bump on his right inner thigh, which has been present for several months and has been itching recently.  It has not changed in any other way, including in size, shape, or color, and it does not hurt or bleed.  Lastly, he notes a dry, flaky scalp.  He denies any other new, changing, or concerning skin lesions since his last visit; no bleeding, itching, or burning lesions.      Review of Systems:  No other skin or systemic complaints other than what is documented elsewhere in the note.    The following portions of the chart were reviewed this encounter and updated as appropriate:       Skin Cancer History  No skin cancer on file.    Specialty Problems          Dermatology Problems    Actinic keratosis    Inflamed seborrheic keratosis    Melanocytic nevi of other parts of face    Melanocytic nevi of scalp and neck    Melanocytic nevi of trunk    Melanocytic nevi of unspecified lower limb, including hip    Melanocytic nevi of unspecified upper limb, including shoulder    Neoplasm of uncertain behavior of skin    Other seborrheic keratosis    Personal history of other malignant neoplasm of skin    Seborrheic dermatitis, unspecified    Skin changes due to chronic exposure to nonionizing radiation, unspecified       Past Dermatologic / Past Relevant Medical History:    - 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 Jennifer Garrett at [facility], on 4/18/23 s/p wide local excision with 2-cm margins and positive left axillary sentinel lymph node biopsy (1 out of 1 lymph node positive for alf) by Dr. Bhavin Payne on 5/8/23, followed by " Tell her to reduce the amlodipine back to 5 mg daily and lets add Bystolic 5 mg daily.  So she will be on diovan/hct, amlodipine 5 and bystolic 5.   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 Snowden on 1/10/25  - mildly dysplastic compound nevus on left lateral proximal arm diagnosed at initial visit on 7/5/23    - Tinea pedis  - folliculitis  - h/o Prostate Cancer, for which he follows with Dr. 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    Current Medications / CAM's:    Current Outpatient Medications:     cholecalciferol (Vitamin D-3) 25 MCG (1000 UT) tablet, Take 1 tablet (25 mcg) by mouth once daily., Disp: , Rfl:     clindamycin (Cleocin T) 1 % lotion, Apply topically 2 times a day., Disp: 60 mL, Rfl: 11    glucosamine sulfate 500 mg capsule, Take 1 capsule by mouth once daily., Disp: , Rfl:     ketoconazole (NIZOral) 2 % shampoo, Wash affected areas of scalp 2-3 times weekly as directed, Disp: 120 mL, Rfl: 11    levothyroxine (Synthroid) 88 mcg tablet, Take 1 tablet (88 mcg) by mouth once daily. Take on an empty stomach at the same time each day, either 30 to 60 minutes prior to breakfast, Disp: 30 tablet, Rfl: 3    pravastatin (Pravachol) 40 mg tablet, Take 1 tablet (40 mg) by mouth once daily at bedtime., Disp: 90 tablet, Rfl: 1    ZINC ORAL, Take 1 tablet by mouth once daily. Zinc 140 mg (as elemental zinc 50 mg) oral tablet, Disp: , Rfl:      Objective   Well appearing patient in no apparent distress; mood and affect are within normal limits.    A full examination was  performed including scalp, face, eyes, ears, nose, lips, neck, chest, axillae, abdomen, back, bilateral upper extremities, and bilateral lower extremities. All findings within normal limits unless otherwise noted below.    Assessment/Plan   1. Neoplasm of uncertain behavior of skin (2)  Left Lateral Forehead  8 mm dark brown pigmented, asymmetric macule with an asymmetric pigment network and irregular borders           Shave removal    Lesion length (cm):  0.8  Margin per side (cm):  0  Lesion diameter (cm):  0.8  Informed consent: discussed and consent obtained    Timeout: patient name, date of birth, surgical site, and procedure verified    Procedure prep:  Patient was prepped and draped  Anesthesia: the lesion was anesthetized in a standard fashion    Anesthetic:  1% lidocaine w/ epinephrine 1-100,000 local infiltration  Instrument used: flexible razor blade    Hemostasis achieved with: aluminum chloride    Outcome: patient tolerated procedure well    Post-procedure details: sterile dressing applied and wound care instructions given    Dressing type: bandage and petrolatum      Staff Communication: Dermatology Local Anesthesia: 1 % Lidocaine / Epinephrine - Amount:0.5ml    Specimen 1 - Dermatopathology- DERM LAB  Differential Diagnosis: SK v AMH  Check Margins Yes/No?:    Comments:    Dermpath Lab: Routine Histopathology (formalin-fixed tissue)    Left Lateral Proximal Dorsal Hand  7 mm dark brown pigmented, asymmetric macule with an asymmetric pigment network and irregular borders           Shave removal    Lesion length (cm):  0.7  Margin per side (cm):  0  Lesion diameter (cm):  0.7  Informed consent: discussed and consent obtained    Timeout: patient name, date of birth, surgical site, and procedure verified    Procedure prep:  Patient was prepped and draped  Anesthesia: the lesion was anesthetized in a standard fashion    Anesthetic:  1% lidocaine w/ epinephrine 1-100,000 local infiltration  Instrument used:  flexible razor blade    Hemostasis achieved with: aluminum chloride    Outcome: patient tolerated procedure well    Post-procedure details: sterile dressing applied and wound care instructions given    Dressing type: bandage and petrolatum      Staff Communication: Dermatology Local Anesthesia: 1 % Lidocaine / Epinephrine - Amount:0.5ml    Specimen 2 - Dermatopathology- DERM LAB  Differential Diagnosis: SK v AMH  Check Margins Yes/No?:    Comments:    Dermpath Lab: Routine Histopathology (formalin-fixed tissue)    2. Inflamed seborrheic keratosis  Right Thigh - Anterior  On the patient's right anterior medial mid thigh, there is a 7 mm erythematous and light brown-colored, hyperkeratotic, stuck-on appearing papule with a surrounding rim of erythema    Inflamed Seborrheic Keratosis - right anterior medial mid thigh.  The benign nature of this lesion was discussed with the patient today and reassurance provided.  Given the history the patient provides of frequent irritation and associated symptoms as well as its inflamed appearance on exam today, I offered to treat this lesion with liquid nitrogen cryotherapy.  The patient expressed understanding, is in agreement with this plan, and wishes to proceed with cryotherapy today.    Destr of lesion - Right Thigh - Anterior  Complexity: simple    Destruction method: cryotherapy    Informed consent: discussed and consent obtained    Lesion destroyed using liquid nitrogen: Yes    Cryotherapy cycles:  2  Outcome: patient tolerated procedure well with no complications    Post-procedure details: wound care instructions given      3. Actinic keratosis (16)  Head - Anterior (Face) (16)  Scattered on the patient's face, there are multiple erythematous, gritty, scaly macules     Actinic Keratoses -scattered on face.  The pre-cancerous nature of these lesions and treatment options were discussed with the patient today.  At this time, I recommend treatment with liquid nitrogen  cryotherapy.  The patient expressed understanding, is in agreement with this plan, and wishes to proceed with cryotherapy today.    Destr of lesion - Head - Anterior (Face) (16)  Complexity: simple    Destruction method: cryotherapy    Informed consent: discussed and consent obtained    Lesion destroyed using liquid nitrogen: Yes    Cryotherapy cycles:  1  Outcome: patient tolerated procedure well with no complications    Post-procedure details: wound care instructions given      4. Seborrheic dermatitis  Scalp  On the patient's scalp, there are pink, scaly patches with whitish-yellowish, greasy scale    Seborrheic Dermatitis - scalp.  The potentially chronic and intermittently flaring nature of this condition and treatment options were discussed extensively with the patient today.  At this time, I recommend topical anti-fungal therapy with Ketoconazole 2% shampoo, which the patient was instructed to use 2-3 days per week, alternating with over-the-counter anti-dandruff shampoos, such as Head & Shoulders, Selsun Blue, and Neutrogena T-gel, every month.  The risks, benefits, and side effects of this medication were discussed.  The patient expressed understanding and is in agreement with this plan.    ketoconazole (NIZOral) 2 % shampoo - Scalp  Wash affected areas of scalp 2-3 times weekly as directed    5. Melanocytic nevus of trunk  Scattered on the patient's face, neck, trunk, and extremities, there are several small, round- to oval-shaped, brown-pigmented and pink-colored, symmetric, uniform-appearing macules and dome-shaped papules    Clinically benign- to slightly atypical-appearing nevi - the clinically benign- to slightly atypical-appearing nature of the patient's nevi was discussed with the patient today.  None of the patient's nevi meet threshold for biopsy today.  I emphasized the importance of performing monthly self-skin exams using the ABCDs of monitoring moles, which were reviewed with the patient today  and an informational hand-out provided.  I also emphasized the importance of sun avoidance and sun protection with daily sunscreen use.  The patient expressed understanding and is in agreement with this plan.    6. Seborrheic keratosis  Scattered on the patient's face, neck, trunk, and extremities, there are multiple tan- to light brown-colored, hyperkeratotic, stuck-on appearing papules of varying size and shape    Seborrheic Keratoses - the benign nature of these lesions was discussed with the patient today and reassurance provided.  No treatment is medically indicated for the noninflamed SKs at this time.    7. Hemangioma of skin  Scattered on the patient's face, neck, trunk, and extremities, there are multiple small, round, cherry red- to purplish-colored, symmetric, uniform, vascular-appearing macules and papules    Cherry Angiomas - the benign nature of these vascular lesions was discussed with the patient today and reassurance provided.  No treatment is medically indicated for these lesions at this time.    8. History of Merkel cell carcinoma  On his left forearm, there is a well-healed scar with no evidence of recurrent growth or any new lesions concerning for primary or recurrent alf on exam today.  On his left lateral proximal arm and left proximal dorsal hand, there are well-healed scars with no evidence of recurrent growth or pigmentation on exam today.    History of Merkel cell carcinoma, atypical junctional melanocytic proliferation, dysplastic nevus, and actinic keratoses photodamage.  There is no evidence of recurrence on exam today.  I emphasized the importance of continuing to perform monthly self-skin exams using the ABCDs of monitoring moles, which were reviewed with the patient, as well as the importance of sun avoidance and sun protection with daily sunscreen use.  I will have the patient return to our office in 3-4 months, pending the above biopsy results, for routine follow-up and skin exam  and the patient was instructed to call our office should the patient notice any new, changing, symptomatic, or otherwise concerning skin lesions before then.  He was also instructed to continue to follow closely with his hematologist/oncologist for continued evaluation and management.  The patient expressed understanding and is in agreement with this plan.    9. Diffuse photodamage of skin  Photodistributed  Diffuse photodamage with actinic changes with telangiectasia and mottled pigmentation in sun-exposed areas.    Photodamage.  The signs and symptoms of skin cancer were reviewed and the patient was advised to practice sun protection and sun avoidance, use daily sunscreen, and perform regular self skin exams.  Sun protection was discussed, including avoiding the mid-day sun, wearing a sunscreen with SPF at least 50, and stressing the need for reapplication of sunscreen and applying more than they think they need.

## 2025-03-17 NOTE — TELEPHONE ENCOUNTER
Patient stated she started Amlodipine 10mg 1 po QD last month and was advised to call if she noticed and swelling in her legs or feet.  Patient stated she noticed swelling over a week ago, but this weekend the swelling became severe on both legs, feet and toes.  Bottom of her feet are red.  Patient stated Sat, Sun and today she cut the dose in half and her BP is elevated (150's/?)  Please advise.      Gladys Trevino Staff  Caller: Unspecified (Today,  9:16 AM)  Who Called: Kiarra Arredondo    Caller is requesting assistance/information from provider's office.    Symptoms (please be specific): swelling in toes, feet and legs  How long has patient had these symptoms:  1 month  List of preferred pharmacies on file (remove unneeded): [unfilled]  If different, enter pharmacy into here including location and phone number:      Preferred Method of Contact: Phone Call  Patient's Preferred Phone Number on File: [phone]  Best Call Back Number, if different:  Additional Information:       told pt to call if she was having any swelling.  She has swelling in toes, feet and legs.    Medication is: amLODIPine (NORVASC) 10 MG tablet

## 2025-03-18 RX ORDER — NEBIVOLOL 5 MG/1
5 TABLET ORAL DAILY
Qty: 30 TABLET | Refills: 11 | Status: SHIPPED | OUTPATIENT
Start: 2025-03-18 | End: 2026-03-18

## 2025-04-23 DIAGNOSIS — E78.5 HYPERLIPIDEMIA, UNSPECIFIED HYPERLIPIDEMIA TYPE: Primary | ICD-10-CM

## 2025-04-23 RX ORDER — ROSUVASTATIN CALCIUM 5 MG/1
5 TABLET, COATED ORAL NIGHTLY
Qty: 90 TABLET | Refills: 3 | Status: SHIPPED | OUTPATIENT
Start: 2025-04-23

## 2025-04-30 ENCOUNTER — TELEPHONE (OUTPATIENT)
Dept: INTERNAL MEDICINE | Facility: CLINIC | Age: OVER 89
End: 2025-04-30
Payer: MEDICARE

## 2025-04-30 NOTE — TELEPHONE ENCOUNTER
Rosita Dickson Staff  Caller: Unspecified (Today, 11:28 AM)  .Who Called: Kiarra Arredondo    Patient is returning phone call    Who Left Message for Patient:PT  Does the patient know what this is regarding?: Pt called in stating she would like a call from nurse about her blood pressure high and pulse low(161 over 54) and retaining lots of fluid in feet and leg      Preferred Method of Contact: Phone Call  Patient's Preferred Phone Number on File: [phone]  Best Call Back Number, if different:  Additional Information: Pt called in stating she would like a call from nurse about her blood pressure high and pulse low(161 over 54) and retaining lots of fluid in feet and leg

## 2025-04-30 NOTE — TELEPHONE ENCOUNTER
"Patient stated she has been taking her BP medications as prescribed and her numbers are still high this morning 161/54.  Patient stated she tires easily.  Patient stated her feet and ankles are swollen.  Patient stated she feel like she is retaining fluid and feels "puffy".   Please advise.  "

## 2025-05-01 DIAGNOSIS — I10 PRIMARY HYPERTENSION: ICD-10-CM

## 2025-05-01 RX ORDER — AMLODIPINE BESYLATE 5 MG/1
5 TABLET ORAL DAILY
Qty: 30 TABLET | Refills: 3 | Status: SHIPPED | OUTPATIENT
Start: 2025-05-01

## 2025-05-01 RX ORDER — DOXAZOSIN 1 MG/1
1 TABLET ORAL NIGHTLY
Qty: 30 TABLET | Refills: 3 | Status: SHIPPED | OUTPATIENT
Start: 2025-05-01 | End: 2026-05-01

## 2025-05-29 ENCOUNTER — TELEPHONE (OUTPATIENT)
Dept: INTERNAL MEDICINE | Facility: CLINIC | Age: OVER 89
End: 2025-05-29
Payer: MEDICARE

## 2025-05-29 NOTE — TELEPHONE ENCOUNTER
----- Message from Nurse Guillen sent at 5/29/2025  9:11 AM CDT -----  Regarding: BALDEMAR Rodriguez 6/5/25 @10:00a  Are there any outstanding tasks in the chart? noIs there any documentation of tasks? noPlease tell patient to bring living will, power of , or advance directive document to visit if they have it.

## 2025-06-04 ENCOUNTER — TELEPHONE (OUTPATIENT)
Dept: INTERNAL MEDICINE | Facility: CLINIC | Age: OVER 89
End: 2025-06-04
Payer: MEDICARE

## 2025-06-05 ENCOUNTER — OFFICE VISIT (OUTPATIENT)
Dept: INTERNAL MEDICINE | Facility: CLINIC | Age: OVER 89
End: 2025-06-05
Payer: MEDICARE

## 2025-06-05 VITALS
OXYGEN SATURATION: 99 % | SYSTOLIC BLOOD PRESSURE: 148 MMHG | HEIGHT: 62 IN | DIASTOLIC BLOOD PRESSURE: 60 MMHG | WEIGHT: 147 LBS | RESPIRATION RATE: 18 BRPM | HEART RATE: 61 BPM | BODY MASS INDEX: 27.05 KG/M2

## 2025-06-05 DIAGNOSIS — I10 PRIMARY HYPERTENSION: ICD-10-CM

## 2025-06-05 PROCEDURE — 99213 OFFICE O/P EST LOW 20 MIN: CPT | Mod: ,,, | Performed by: INTERNAL MEDICINE

## 2025-06-05 RX ORDER — DOXAZOSIN 2 MG/1
2 TABLET ORAL NIGHTLY
Qty: 30 TABLET | Refills: 11 | Status: SHIPPED | OUTPATIENT
Start: 2025-06-05 | End: 2026-06-05

## 2025-06-05 RX ORDER — AMLODIPINE BESYLATE 2.5 MG/1
2.5 TABLET ORAL DAILY
Qty: 90 TABLET | Refills: 3 | Status: SHIPPED | OUTPATIENT
Start: 2025-06-05

## 2025-08-04 DIAGNOSIS — I10 PRIMARY HYPERTENSION: ICD-10-CM

## 2025-08-04 RX ORDER — VALSARTAN AND HYDROCHLOROTHIAZIDE 320; 25 MG/1; MG/1
1 TABLET, FILM COATED ORAL
Qty: 90 TABLET | Refills: 3 | Status: SHIPPED | OUTPATIENT
Start: 2025-08-04

## 2025-08-04 NOTE — TELEPHONE ENCOUNTER
[facility] lives in assisted living [facility]  Patient is giving up her car and wants a letter from Dr. Rodriguez stated patient needs to move for medical reasons to be near her son.  Patient will bring a form for Dr. Rodriguez to review and sign.

## 2025-08-04 NOTE — TELEPHONE ENCOUNTER
Copied from CRM #[id number]. Topic: General Inquiry - Patient Advice  >> Aug 4, 2025 10:59 AM Jenniffer wrote:  Who Called: Kiarra Arredondo    Caller is requesting assistance/information from provider's office.    Symptoms (please be specific):    How long has patient had these symptoms:    List of preferred pharmacies on file (remove unneeded): [unfilled]  If different, enter pharmacy into here including location and phone number:       Preferred Method of Contact: Phone Call  Patient's Preferred Phone Number on File: [phone]   Best Call Back Number, if different:  Additional Information: PT is requesting a form be filled out, would like nurse to give her a call so she can explain what is needed. Please advise.

## 2025-08-05 NOTE — TELEPHONE ENCOUNTER
Patient notified that letter to terminate lease is ready to be picked up. She verbalized understanding.    Yes

## 2025-08-28 DIAGNOSIS — I10 PRIMARY HYPERTENSION: ICD-10-CM

## 2025-08-28 RX ORDER — DOXAZOSIN 1 MG/1
1 TABLET ORAL NIGHTLY
Qty: 30 TABLET | Refills: 3 | OUTPATIENT
Start: 2025-08-28

## 2025-09-04 ENCOUNTER — OFFICE VISIT (OUTPATIENT)
Dept: INTERNAL MEDICINE | Facility: CLINIC | Age: OVER 89
End: 2025-09-04
Payer: MEDICARE

## 2025-09-04 VITALS
DIASTOLIC BLOOD PRESSURE: 70 MMHG | RESPIRATION RATE: 18 BRPM | HEIGHT: 62 IN | OXYGEN SATURATION: 97 % | HEART RATE: 67 BPM | BODY MASS INDEX: 26.5 KG/M2 | SYSTOLIC BLOOD PRESSURE: 168 MMHG | WEIGHT: 144 LBS

## 2025-09-04 DIAGNOSIS — I10 PRIMARY HYPERTENSION: Primary | ICD-10-CM

## 2025-09-04 DIAGNOSIS — E78.5 HYPERLIPIDEMIA, UNSPECIFIED HYPERLIPIDEMIA TYPE: ICD-10-CM

## 2025-09-04 PROCEDURE — 99213 OFFICE O/P EST LOW 20 MIN: CPT | Mod: ,,, | Performed by: INTERNAL MEDICINE

## 2025-09-04 RX ORDER — ROSUVASTATIN CALCIUM 5 MG/1
5 TABLET, COATED ORAL NIGHTLY
Qty: 90 TABLET | Refills: 3 | Status: SHIPPED | OUTPATIENT
Start: 2025-09-04

## 2025-09-04 RX ORDER — DOXAZOSIN 4 MG/1
4 TABLET ORAL NIGHTLY
Qty: 30 TABLET | Refills: 11 | Status: SHIPPED | OUTPATIENT
Start: 2025-09-04 | End: 2026-09-04